# Patient Record
Sex: FEMALE | Race: WHITE | Employment: UNEMPLOYED | URBAN - NONMETROPOLITAN AREA
[De-identification: names, ages, dates, MRNs, and addresses within clinical notes are randomized per-mention and may not be internally consistent; named-entity substitution may affect disease eponyms.]

---

## 2017-06-06 ENCOUNTER — OFFICE VISIT (OUTPATIENT)
Dept: FAMILY MEDICINE CLINIC | Age: 22
End: 2017-06-06
Payer: COMMERCIAL

## 2017-06-06 VITALS
BODY MASS INDEX: 18.66 KG/M2 | OXYGEN SATURATION: 100 % | SYSTOLIC BLOOD PRESSURE: 90 MMHG | HEIGHT: 65 IN | WEIGHT: 112 LBS | DIASTOLIC BLOOD PRESSURE: 62 MMHG | HEART RATE: 54 BPM

## 2017-06-06 DIAGNOSIS — Z29.8 NEED FOR MALARIA PROPHYLAXIS: Primary | ICD-10-CM

## 2017-06-06 PROCEDURE — 99213 OFFICE O/P EST LOW 20 MIN: CPT | Performed by: NURSE PRACTITIONER

## 2017-06-06 RX ORDER — ATOVAQUONE AND PROGUANIL HYDROCHLORIDE 250; 100 MG/1; MG/1
1 TABLET, FILM COATED ORAL DAILY
Qty: 21 TABLET | Refills: 0 | Status: SHIPPED | OUTPATIENT
Start: 2017-08-03 | End: 2017-08-24

## 2017-06-06 ASSESSMENT — ENCOUNTER SYMPTOMS
SINUS PRESSURE: 0
CHEST TIGHTNESS: 0
TROUBLE SWALLOWING: 0
ALLERGIC/IMMUNOLOGIC NEGATIVE: 1
DIARRHEA: 0
EYES NEGATIVE: 1
RESPIRATORY NEGATIVE: 1
ABDOMINAL PAIN: 0
GASTROINTESTINAL NEGATIVE: 1
SHORTNESS OF BREATH: 0
CONSTIPATION: 0
VOMITING: 0
NAUSEA: 0
COUGH: 0

## 2018-05-08 ENCOUNTER — OFFICE VISIT (OUTPATIENT)
Dept: FAMILY MEDICINE CLINIC | Age: 23
End: 2018-05-08
Payer: COMMERCIAL

## 2018-05-08 VITALS
HEART RATE: 76 BPM | DIASTOLIC BLOOD PRESSURE: 56 MMHG | HEIGHT: 65 IN | WEIGHT: 115.2 LBS | BODY MASS INDEX: 19.19 KG/M2 | RESPIRATION RATE: 16 BRPM | SYSTOLIC BLOOD PRESSURE: 118 MMHG

## 2018-05-08 DIAGNOSIS — L98.9 SKIN LESION OF NECK: Primary | ICD-10-CM

## 2018-05-08 PROCEDURE — 99213 OFFICE O/P EST LOW 20 MIN: CPT | Performed by: PHYSICIAN ASSISTANT

## 2018-05-08 ASSESSMENT — PATIENT HEALTH QUESTIONNAIRE - PHQ9
2. FEELING DOWN, DEPRESSED OR HOPELESS: 0
SUM OF ALL RESPONSES TO PHQ9 QUESTIONS 1 & 2: 0
SUM OF ALL RESPONSES TO PHQ QUESTIONS 1-9: 0
1. LITTLE INTEREST OR PLEASURE IN DOING THINGS: 0

## 2018-05-09 ASSESSMENT — ENCOUNTER SYMPTOMS
COLOR CHANGE: 1
RESPIRATORY NEGATIVE: 1

## 2019-09-11 ENCOUNTER — HOSPITAL ENCOUNTER (OUTPATIENT)
Dept: GENERAL RADIOLOGY | Age: 24
Discharge: HOME | End: 2019-09-13
Payer: COMMERCIAL

## 2019-09-11 ENCOUNTER — OFFICE VISIT (OUTPATIENT)
Dept: FAMILY MEDICINE CLINIC | Age: 24
End: 2019-09-11
Payer: COMMERCIAL

## 2019-09-11 VITALS
TEMPERATURE: 98.2 F | WEIGHT: 113.6 LBS | DIASTOLIC BLOOD PRESSURE: 60 MMHG | HEIGHT: 65 IN | HEART RATE: 58 BPM | BODY MASS INDEX: 18.93 KG/M2 | OXYGEN SATURATION: 99 % | RESPIRATION RATE: 16 BRPM | SYSTOLIC BLOOD PRESSURE: 106 MMHG

## 2019-09-11 DIAGNOSIS — R00.2 PALPITATIONS: ICD-10-CM

## 2019-09-11 DIAGNOSIS — R00.2 PALPITATIONS: Primary | ICD-10-CM

## 2019-09-11 PROCEDURE — 93000 ELECTROCARDIOGRAM COMPLETE: CPT | Performed by: FAMILY MEDICINE

## 2019-09-11 PROCEDURE — 99214 OFFICE O/P EST MOD 30 MIN: CPT | Performed by: FAMILY MEDICINE

## 2019-09-11 PROCEDURE — 71046 X-RAY EXAM CHEST 2 VIEWS: CPT

## 2019-09-11 ASSESSMENT — PATIENT HEALTH QUESTIONNAIRE - PHQ9
SUM OF ALL RESPONSES TO PHQ9 QUESTIONS 1 & 2: 0
1. LITTLE INTEREST OR PLEASURE IN DOING THINGS: 0
2. FEELING DOWN, DEPRESSED OR HOPELESS: 0
SUM OF ALL RESPONSES TO PHQ QUESTIONS 1-9: 0
SUM OF ALL RESPONSES TO PHQ QUESTIONS 1-9: 0

## 2019-11-27 LAB
ALBUMIN SERPL-MCNC: 4.8 G/DL
ALP BLD-CCNC: 64 U/L
ALT SERPL-CCNC: 11 U/L
ANION GAP SERPL CALCULATED.3IONS-SCNC: NORMAL MMOL/L
AST SERPL-CCNC: 16 U/L
BASOPHILS ABSOLUTE: 112 /ΜL
BASOPHILS RELATIVE PERCENT: 1.4 %
BILIRUB SERPL-MCNC: 0.4 MG/DL (ref 0.1–1.4)
BUN BLDV-MCNC: 16 MG/DL
CALCIUM SERPL-MCNC: 10.3 MG/DL
CHLORIDE BLD-SCNC: 102 MMOL/L
CO2: NORMAL
CREAT SERPL-MCNC: 0.74 MG/DL
EOSINOPHILS ABSOLUTE: 656 /ΜL
EOSINOPHILS RELATIVE PERCENT: 8.2 %
GFR CALCULATED: 114
GLUCOSE BLD-MCNC: 82 MG/DL
HCT VFR BLD CALC: 42.3 % (ref 36–46)
HEMOGLOBIN: 14.6 G/DL (ref 12–16)
LYMPHOCYTES ABSOLUTE: 2184 /ΜL
LYMPHOCYTES RELATIVE PERCENT: 27.3 %
MCH RBC QN AUTO: 30 PG
MCHC RBC AUTO-ENTMCNC: 34.5 G/DL
MCV RBC AUTO: 86.9 FL
MONOCYTES ABSOLUTE: 504 /ΜL
MONOCYTES RELATIVE PERCENT: 6.3 %
NEUTROPHILS ABSOLUTE: 4544 /ΜL
NEUTROPHILS RELATIVE PERCENT: 56.8 %
PLATELET # BLD: 302 K/ΜL
PMV BLD AUTO: 11.3 FL
POTASSIUM SERPL-SCNC: 4.6 MMOL/L
RBC # BLD: NORMAL 10*6/UL
SODIUM BLD-SCNC: 139 MMOL/L
TOTAL PROTEIN: 7.6
TSH SERPL DL<=0.05 MIU/L-ACNC: 2.73 UIU/ML
WBC # BLD: 8 10^3/ML

## 2019-12-18 LAB
BASOPHILS ABSOLUTE: 0 /ΜL
BASOPHILS RELATIVE PERCENT: 0 %
EOSINOPHILS ABSOLUTE: 500 /ΜL
EOSINOPHILS RELATIVE PERCENT: 10 %
HCT VFR BLD CALC: 40.7 % (ref 36–46)
HEMOGLOBIN: 13.9 G/DL (ref 12–16)
LYMPHOCYTES ABSOLUTE: 1650 /ΜL
LYMPHOCYTES RELATIVE PERCENT: 33 %
MCH RBC QN AUTO: 30 PG
MCHC RBC AUTO-ENTMCNC: 34.2 G/DL
MCV RBC AUTO: 87.7 FL
MONOCYTES ABSOLUTE: 350 /ΜL
MONOCYTES RELATIVE PERCENT: 7 %
NEUTROPHILS ABSOLUTE: 2500 /ΜL
NEUTROPHILS RELATIVE PERCENT: 50 %
PLATELET # BLD: 281 K/ΜL
PMV BLD AUTO: 11.8 FL
RBC # BLD: NORMAL 10*6/UL
WBC # BLD: 5 10^3/ML

## 2020-01-20 LAB
BASOPHILS ABSOLUTE: 45 /ΜL
BASOPHILS RELATIVE PERCENT: 1 %
EOSINOPHILS ABSOLUTE: 315 /ΜL
EOSINOPHILS RELATIVE PERCENT: 7 %
HCT VFR BLD CALC: 39.9 % (ref 36–46)
HEMOGLOBIN: 13.8 G/DL (ref 12–16)
LYMPHOCYTES ABSOLUTE: 1620 /ΜL
LYMPHOCYTES RELATIVE PERCENT: 36 %
MCH RBC QN AUTO: 30.3 PG
MCHC RBC AUTO-ENTMCNC: 34.6 G/DL
MCV RBC AUTO: 87.5 FL
MONOCYTES ABSOLUTE: 270 /ΜL
MONOCYTES RELATIVE PERCENT: 6 %
NEUTROPHILS ABSOLUTE: 2250 /ΜL
NEUTROPHILS RELATIVE PERCENT: 50 %
PLATELET # BLD: 267 K/ΜL
PMV BLD AUTO: 11.7 FL
RBC # BLD: NORMAL 10*6/UL
WBC # BLD: 4.5 10^3/ML

## 2020-03-02 LAB
VITAMIN D 25-HYDROXY: 44
VITAMIN D2, 25 HYDROXY: NORMAL
VITAMIN D3,25 HYDROXY: NORMAL

## 2020-03-18 ENCOUNTER — OFFICE VISIT (OUTPATIENT)
Dept: FAMILY MEDICINE CLINIC | Age: 25
End: 2020-03-18
Payer: COMMERCIAL

## 2020-03-18 VITALS
BODY MASS INDEX: 17.79 KG/M2 | HEIGHT: 65 IN | WEIGHT: 106.78 LBS | SYSTOLIC BLOOD PRESSURE: 100 MMHG | DIASTOLIC BLOOD PRESSURE: 60 MMHG | HEART RATE: 72 BPM

## 2020-03-18 PROCEDURE — 99213 OFFICE O/P EST LOW 20 MIN: CPT | Performed by: PHYSICIAN ASSISTANT

## 2020-03-18 RX ORDER — NORETHINDRONE ACETATE AND ETHINYL ESTRADIOL, ETHINYL ESTRADIOL AND FERROUS FUMARATE 1MG-10(24)
KIT ORAL
COMMUNITY
Start: 2020-02-06 | End: 2021-11-15 | Stop reason: ALTCHOICE

## 2020-03-18 ASSESSMENT — PATIENT HEALTH QUESTIONNAIRE - PHQ9
SUM OF ALL RESPONSES TO PHQ QUESTIONS 1-9: 2
SUM OF ALL RESPONSES TO PHQ QUESTIONS 1-9: 2
1. LITTLE INTEREST OR PLEASURE IN DOING THINGS: 1
2. FEELING DOWN, DEPRESSED OR HOPELESS: 1
SUM OF ALL RESPONSES TO PHQ9 QUESTIONS 1 & 2: 2

## 2020-03-18 NOTE — PROGRESS NOTES
Subjective:      Patient ID: Shalini Hunter is a 22 y.o. female. Patient is seen in the office to discuss recent events. Patient reports that she had a choking episode last fall. The Heimlich maneuver was completed on patient at that time. Patient reports anxiety of the situation initially. She went to counseling and anxiety seemed to improve. She has not been able to eat alone since that time. She moved from her own apartment to live with her boyfriend in fears of being alone. She has a sensation of difficulty swallowing since the episode. She is able to swallow liquids without difficulty but does have trouble swallowing solids. She reports that her anxiety has become more generalized. She has considered scheduling an appointment with the counselor again. She is currently living at home and doing distance learning in the light of COVID-19. Patient also admits to amenorrhea. She has been seen at [facility] and was started on OCP. She reports low progesterone levels. She is 80+ days without a menstrual cycle currently. Review of Systems   Constitutional: Negative. HENT: Positive for trouble swallowing. Respiratory: Negative. Gastrointestinal: Negative. Genitourinary: Positive for menstrual problem. Psychiatric/Behavioral: The patient is nervous/anxious. Objective:   Physical Exam  HENT:      Head: Normocephalic. Left Ear: Tympanic membrane normal.   Cardiovascular:      Rate and Rhythm: Normal rate and regular rhythm. Pulmonary:      Effort: Pulmonary effort is normal.   Abdominal:      General: Abdomen is flat. Palpations: Abdomen is soft. Skin:     General: Skin is warm and dry. Neurological:      General: No focal deficit present. Mental Status: She is alert. Psychiatric:         Mood and Affect: Mood is anxious. Assessment:      1. Dysphagia, unspecified type    2. Situational anxiety    3.  Amenorrhea          Plan:      Obtain laboratory studies completed by [employer]. Schedule swallow study. Patient encouraged to schedule appointment with counselor. Coping strategies and self-relaxation techniques. Follow-up in one months/sooner PRN.         ANASTASIA Matson

## 2020-03-21 ASSESSMENT — ENCOUNTER SYMPTOMS
RESPIRATORY NEGATIVE: 1
TROUBLE SWALLOWING: 1
GASTROINTESTINAL NEGATIVE: 1

## 2020-03-31 ENCOUNTER — HOSPITAL ENCOUNTER (OUTPATIENT)
Dept: GENERAL RADIOLOGY | Age: 25
Discharge: HOME | End: 2020-04-02
Payer: COMMERCIAL

## 2020-03-31 PROCEDURE — 74220 X-RAY XM ESOPHAGUS 1CNTRST: CPT

## 2020-03-31 PROCEDURE — 2500000003 HC RX 250 WO HCPCS: Performed by: PHYSICIAN ASSISTANT

## 2020-03-31 RX ADMIN — BARIUM SULFATE 750 ML: 960 POWDER, FOR SUSPENSION ORAL at 09:46

## 2020-03-31 RX ADMIN — BARIUM SULFATE 140 ML: 980 POWDER, FOR SUSPENSION ORAL at 09:46

## 2020-04-02 ENCOUNTER — PATIENT MESSAGE (OUTPATIENT)
Dept: FAMILY MEDICINE CLINIC | Age: 25
End: 2020-04-02

## 2020-04-06 ENCOUNTER — PATIENT MESSAGE (OUTPATIENT)
Dept: FAMILY MEDICINE CLINIC | Age: 25
End: 2020-04-06

## 2020-05-21 ENCOUNTER — PATIENT MESSAGE (OUTPATIENT)
Dept: FAMILY MEDICINE CLINIC | Age: 25
End: 2020-05-21

## 2020-05-22 ENCOUNTER — PATIENT MESSAGE (OUTPATIENT)
Dept: FAMILY MEDICINE CLINIC | Age: 25
End: 2020-05-22

## 2020-05-22 NOTE — TELEPHONE ENCOUNTER
From: Nilam Menchaca  To: ANASTASIA Engle  Sent: 5/21/2020 8:28 PM EDT  Subject: Visit Follow-Up Question    Hello,     I just wanted to reach out and ask about your recommendation for an OB/GYN to discuss my irregular periods. My last period was 112 days apart, which was the longest it's ever been. I just had an appointment with Dr. Hallie Resendez at [facility] and was not satisfied with that appointment. I did not feel that he adequately addressed my concerns. I would like to see another OB/GYN, so was wondering if you had a different one that you would recommend. I appreciate it! Thank you in advance.     Amina Marquez

## 2021-05-04 ENCOUNTER — OFFICE VISIT (OUTPATIENT)
Dept: FAMILY MEDICINE CLINIC | Age: 26
End: 2021-05-04
Payer: COMMERCIAL

## 2021-05-04 VITALS
TEMPERATURE: 98.7 F | BODY MASS INDEX: 18.83 KG/M2 | WEIGHT: 113 LBS | DIASTOLIC BLOOD PRESSURE: 70 MMHG | SYSTOLIC BLOOD PRESSURE: 124 MMHG | HEART RATE: 78 BPM | OXYGEN SATURATION: 98 % | HEIGHT: 65 IN

## 2021-05-04 DIAGNOSIS — F41.1 GAD (GENERALIZED ANXIETY DISORDER): ICD-10-CM

## 2021-05-04 DIAGNOSIS — R13.12 OROPHARYNGEAL DYSPHAGIA: Primary | ICD-10-CM

## 2021-05-04 PROCEDURE — 99214 OFFICE O/P EST MOD 30 MIN: CPT | Performed by: FAMILY MEDICINE

## 2021-05-04 RX ORDER — BUSPIRONE HYDROCHLORIDE 10 MG/1
10 TABLET ORAL 3 TIMES DAILY PRN
Qty: 90 TABLET | Refills: 1 | Status: SHIPPED | OUTPATIENT
Start: 2021-05-04 | End: 2021-11-15 | Stop reason: ALTCHOICE

## 2021-05-04 RX ORDER — CITALOPRAM 10 MG/1
10 TABLET ORAL DAILY
Qty: 30 TABLET | Refills: 1 | Status: SHIPPED | OUTPATIENT
Start: 2021-05-04 | End: 2021-06-29 | Stop reason: SDUPTHER

## 2021-05-04 ASSESSMENT — ENCOUNTER SYMPTOMS
SHORTNESS OF BREATH: 0
WHEEZING: 0
CHEST TIGHTNESS: 0
COUGH: 0

## 2021-05-04 NOTE — PROGRESS NOTES
[address]  [address] [facility] [address]  Dept: [phone]  Dept Fax: [phone]  Loc: [phone]    Padma Mendiola is a 26 y.o. female who presents today for her medical conditions/complaints as noted below. Padma Mendiola is c/o of   Chief Complaint   Patient presents with   [facility] ED Follow-up     [facility] ER follow up, 4/29/21, Foreign body in throat    Other     swallow study       HPI:     HPI Here today for an ER follow up. She was in the ER in [place] on 4/29    She took an Advil while she was taking an exam and it got stuck in her throat. She ended up in the ER and it dislodged while she was there without intervention. She does not have any issues with GERD. She had a similar episode a year ago with food and had a normal swallow study. Since this episode she has been very anxious and she is afraid to eat or swallow pills. 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 She was anxious after her first choking episode and she went to counseling for a few sessions which seemed to help but she has not been back in awhile. Past Medical History:   Diagnosis Date    Pneumonia 1999    history of as a child, hospitalized    Scoliosis     history of           Social History     Tobacco Use    Smoking status: Never Smoker    Smokeless tobacco: Never Used   Substance Use Topics    Alcohol use: Yes     Current Outpatient Medications   Medication Sig Dispense Refill    citalopram (CELEXA) 10 MG tablet Take 1 tablet by mouth daily 30 tablet 1    busPIRone (BUSPAR) 10 MG tablet Take 1 tablet by mouth 3 times daily as needed (anxiety) 90 tablet 1    norethindrone-ethinyl estradiol-Fe (LO LOESTRIN FE) 1 MG-10 MCG / 10 MCG tablet       Multiple Vitamins-Minerals (MULTIVITAMIN ADULT PO) Take by mouth       No current facility-administered medications for this visit.            Allergies   Allergen Reactions  Ceftin [Cefuroxime] Nausea And Vomiting       Subjective:     Review of Systems   Constitutional: Negative for activity change, appetite change, chills, fatigue and fever. Eyes: Negative for visual disturbance. Respiratory: Negative for cough, chest tightness, shortness of breath and wheezing. Cardiovascular: Negative for chest pain, palpitations and leg swelling. Genitourinary: Negative for difficulty urinating. Neurological: Negative for dizziness, syncope, weakness, light-headedness and headaches. Objective:      Physical Exam  Vitals signs and nursing note reviewed. Constitutional:       General: She is not in acute distress. Appearance: She is well-developed. Eyes:      Conjunctiva/sclera: Conjunctivae normal.   Neck:      Musculoskeletal: Normal range of motion and neck supple. Thyroid: No thyromegaly. Cardiovascular:      Rate and Rhythm: Normal rate and regular rhythm. Heart sounds: Normal heart sounds. No murmur. Pulmonary:      Effort: Pulmonary effort is normal. No respiratory distress. Breath sounds: Normal breath sounds. No wheezing. Abdominal:      General: Abdomen is flat. Bowel sounds are normal. There is no distension. Tenderness: There is no abdominal tenderness. There is no guarding or rebound. Hernia: No hernia is present. Lymphadenopathy:      Cervical: No cervical adenopathy. Skin:     General: Skin is warm and dry. Findings: No erythema or rash. Neurological:      Mental Status: She is alert and oriented to person, place, and time. Psychiatric:         Mood and Affect: Mood normal.         Behavior: Behavior normal.         Thought Content: Thought content normal.         Judgment: Judgment normal.       /70   Pulse 78   Temp 98.7 °F (37.1 °C)   Ht 5' 5\" (1.651 m)   Wt 113 lb (51.3 kg)   SpO2 98%   BMI 18.80 kg/m²     Assessment:       Diagnosis Orders   1.  Oropharyngeal dysphagia  [facility] General Surgery, [place]   2. REGGIE (generalized anxiety disorder)               Plan:        Dysphagia: worsening; since she has now had two choking episodes I referred her to general surgery for an EGD to rule a stricture. REGGIE: woresning; Katerine Mejia was started on celexa. I explained that the main side effect is GI distress. I will start with a low dose and titrate up as needed. she was instructed that it can take 4-6weeks before the medication is working to its full potential. She will see her pcp to see how she is doing in a month and increase the dose as needed. I also explained that I don't like to use xanax because it has addictive potential. I told her that I prefer to use buspar for panic attacks and she was willing to try it so a prescription was given. she is planning on going back to counseling. Return in about 1 month (around 6/4/2021) for Anxiety follow up. Orders Placed This Encounter   Procedures   [facility] General Surgery, [place]     Referral Priority:   Routine     Referral Type:   Eval and Treat     Referral Reason:   Specialty Services Required     Requested Specialty:   General Surgery     Number of Visits Requested:   1     Orders Placed This Encounter   Medications    citalopram (CELEXA) 10 MG tablet     Sig: Take 1 tablet by mouth daily     Dispense:  30 tablet     Refill:  1    busPIRone (BUSPAR) 10 MG tablet     Sig: Take 1 tablet by mouth 3 times daily as needed (anxiety)     Dispense:  90 tablet     Refill:  1       Patientgiven educational materials - see patient instructions. Discussed use, benefit,and side effects of prescribed medications. All patient questions answered. Ptvoiced understanding. Reviewed health maintenance. Instructed to continue currentmedications, diet and exercise. Patient agreed with treatment plan. Follow up asdirected.      Electronically signed by Zahida Verdin MD on 5/4/2021 at 1:21 PM

## 2021-05-10 ENCOUNTER — TELEPHONE (OUTPATIENT)
Dept: FAMILY MEDICINE CLINIC | Age: 26
End: 2021-05-10

## 2021-05-13 ENCOUNTER — INITIAL CONSULT (OUTPATIENT)
Dept: SURGERY | Age: 26
End: 2021-05-13
Payer: COMMERCIAL

## 2021-05-13 VITALS
BODY MASS INDEX: 18.66 KG/M2 | DIASTOLIC BLOOD PRESSURE: 70 MMHG | WEIGHT: 112 LBS | HEIGHT: 65 IN | SYSTOLIC BLOOD PRESSURE: 110 MMHG | HEART RATE: 84 BPM | RESPIRATION RATE: 16 BRPM

## 2021-05-13 DIAGNOSIS — R13.14 PHARYNGOESOPHAGEAL DYSPHAGIA: Primary | ICD-10-CM

## 2021-05-13 PROCEDURE — 99203 OFFICE O/P NEW LOW 30 MIN: CPT | Performed by: SURGERY

## 2021-05-13 NOTE — PATIENT INSTRUCTIONS
Patient Education      Patient Education        Upper GI Endoscopy: Before Your Procedure  What is an upper GI endoscopy? An upper gastrointestinal (or GI) endoscopy is a test that allows your doctor to look at the inside of your esophagus, stomach, and the first part of your small intestine, called the duodenum. The esophagus is the tube that carries food to your stomach. The doctor uses a thin, lighted tube that bends. It is called an endoscope, or scope. The doctor puts the tip of the scope in your mouth and gently moves it down your throat. The scope is a flexible video camera. The doctor looks at a monitor (like a TV set or a computer screen) as he or she moves the scope. A doctor may do this procedure to look for ulcers, tumors, infection, or bleeding. It also can be used to look for signs of acid backing up into your esophagus. This is called gastroesophageal reflux disease, or GERD. The doctor can use the scope to take a sample of tissue for study (a biopsy). The doctor also can use the scope to take out growths or stop bleeding. Follow-up care is a key part of your treatment and safety. Be sure to make and go to all appointments, and call your doctor if you are having problems. It's also a good idea to know your test results and keep a list of the medicines you take. How do you prepare for the procedure? Procedures can be stressful. This information will help you understand what you can expect. And it will help you safely prepare for your procedure. Preparing for the procedure    · Do not eat or drink anything for 6 to 8 hours before the test. An empty stomach helps your doctor see your stomach clearly during the test. It also reduces your chances of vomiting. If you vomit, there is a small risk that the vomit could enter your lungs.  (This is called aspiration.) If the test is done in an emergency, a tube may be inserted through your nose or mouth to empty your stomach.     · Do not take sucralfate (Carafate) or antacids on the day of the test. These medicines can make it hard for your doctor to see your upper GI tract.     · If your doctor tells you to, stop taking iron supplements 7 to 14 days before the test.     · Be sure you have someone to take you home. Anesthesia and pain medicine will make it unsafe for you to drive or get home on your own.     · Understand exactly what procedure is planned, along with the risks, benefits, and other options. · Tell your doctor ALL the medicines, vitamins, supplements, and herbal remedies you take. Some may increase the risk of problems during your procedure. Your doctor will tell you if you should stop taking any of them before the procedure and how soon to do it.     · If you take aspirin or some other blood thinner, ask your doctor if you should stop taking it before your procedure. Make sure that you understand exactly what your doctor wants you to do. These medicines increase the risk of bleeding.     · Make sure your doctor and the hospital have a copy of your advance directive. If you don't have one, you may want to prepare one. It lets others know your health care wishes. It's a good thing to have before any type of surgery or procedure. What happens on the day of the procedure? · Follow the instructions exactly about when to stop eating and drinking. If you don't, your procedure may be canceled. If your doctor told you to take your medicines on the day of the procedure, take them with only a sip of water.     · Take a bath or shower before you come in for your procedure. Do not apply lotions, perfumes, deodorants, or nail polish.     · Take off all jewelry and piercings. And take out contact lenses, if you wear them. At the hospital or surgery center   · Bring a picture ID.     · The test may take 15 to 30 minutes.     · The doctor may spray medicine on the back of your throat to numb it. You also will get medicine to prevent pain and to relax you.   · You will lie on your left side. The doctor will put the scope in your mouth and toward the back of your throat. The doctor will tell you when to swallow. This helps the scope move down your throat. You will be able to breathe normally. The doctor will move the scope down your esophagus into your stomach. The doctor also may look at the duodenum.     · If your doctor wants to take a sample of tissue for a biopsy, he or she may use small surgical tools, which are put into the scope, to cut off some tissue. You will not feel a biopsy, if one is taken. The doctor also can use the tools to stop bleeding or to do other treatments, if needed.     · You will stay at the hospital or surgery center for 1 to 2 hours until the medicine you were given wears off. What happens after an upper GI endoscopy? · After the test, you may belch and feel bloated for a while.     · You may have a tickling, dry throat or mouth. You may feel a bit hoarse, and you may have a mild sore throat. These symptoms may last several days. Throat lozenges and warm saltwater gargles can help relieve the throat symptoms.     · Don't drive or operate machinery for 12 hours after the test.     · Your doctor will tell you when you can go back to your usual diet and activities.     · Don't drink alcohol for 12 to 24 hours after the test.   When should you call your doctor? · You have questions or concerns.     · You don't understand how to prepare for your procedure.     · You become ill before the procedure (such as fever, flu, or a cold).     · You need to reschedule or have changed your mind about having the procedure. Where can you learn more? Go to [email or web address] org and sign in to your [employer] account. Enter P790 in the Max-Viz box to learn more about \"Upper GI Endoscopy: Before Your Procedure. \"     If you do not have an account, please click on the \"Sign Up Now\" link.   Current as of: April 15, 2020               Content Version: 12.8  © 2006-2021 Healthwise, [employer]. Care instructions adapted under license by [facility] ([facility]). If you have questions about a medical condition or this instruction, always ask your healthcare professional. [address] any warranty or liability for your use of this information. Upper GI Endoscopy: What to Expect at [address] had an upper GI endoscopy. Your doctor used a thin, lighted tube that bends to look at the inside of your esophagus, your stomach, and the first part of the small intestine, called the duodenum. After you have an endoscopy, you will stay at the hospital or clinic for 1 to 2 hours. This will allow the medicine to wear off. You will be able to go home after your doctor or nurse checks to make sure that you're not having any problems. You may have to stay overnight if you had treatment during the test. You may have a sore throat for a day or two after the test.  This care sheet gives you a general idea about what to expect after the test.  How can you care for yourself at home? Activity   · Rest as much as you need to after you go home. · You should be able to go back to your usual activities the day after the test.  Diet   · Follow your doctor's directions for eating after the test.  · Drink plenty of fluids (unless your doctor has told you not to). Medications   · If you have a sore throat the day after the test, use an over-the-counter spray to numb your throat. Follow-up care is a key part of your treatment and safety. Be sure to make and go to all appointments, and call your doctor if you are having problems. It's also a good idea to know your test results and keep a list of the medicines you take. When should you call for help? Call 911 anytime you think you may need emergency care.  For example, call if:    · You passed out (lost consciousness).     · You have trouble breathing.     · You pass maroon or bloody stools. Call your doctor now or seek immediate medical care if:    · You have pain that does not get better after your take pain medicine.     · You have new or worse belly pain.     · You have blood in your stools.     · You are sick to your stomach and cannot keep fluids down.     · You have a fever.     · You cannot pass stools or gas. Watch closely for changes in your health, and be sure to contact your doctor if:    · Your throat still hurts after a day or two.     · You do not get better as expected. Where can you learn more? Go to https://Udexpepiceweb.[place] [email or web address] org and sign in to your [facility] account. Enter I027 in the Treatful box to learn more about \"Upper GI Endoscopy: What to Expect at Home. \"     If you do not have an account, please click on the \"Sign Up Now\" link. Current as of: April 15, 2020               Content Version: 12.8  © 2006-2021 Healthwise, Incorporated. Care instructions adapted under license by [facility] ([facility]). If you have questions about a medical condition or this instruction, always ask your healthcare professional. [place] [address] any warranty or liability for your use of this information.

## 2021-05-13 NOTE — PROGRESS NOTES
facility-administered medications for this visit. Allergies   Allergen Reactions    Ceftin [Cefuroxime] Nausea And Vomiting       History reviewed. No pertinent family history. Social History     Socioeconomic History    Marital status: Single     Spouse name: Not on file    Number of children: Not on file    Years of education: Not on file    Highest education level: Not on file   Occupational History    Not on file   Social Needs    Financial resource strain: Not on file    Food insecurity     Worry: Not on file     Inability: Not on file    Transportation needs     Medical: Not on file     Non-medical: Not on file   Tobacco Use    Smoking status: Never Smoker    Smokeless tobacco: Never Used   Substance and Sexual Activity    Alcohol use:  Yes    Drug use: No    Sexual activity: Not on file   Lifestyle    Physical activity     Days per week: Not on file     Minutes per session: Not on file    Stress: Not on file   Relationships    Social connections     Talks on phone: Not on file     Gets together: Not on file     Attends Synagogue service: Not on file     Active member of club or organization: Not on file     Attends meetings of clubs or organizations: Not on file     Relationship status: Not on file    Intimate partner violence     Fear of current or ex partner: Not on file     Emotionally abused: Not on file     Physically abused: Not on file     Forced sexual activity: Not on file   Other Topics Concern    Not on file   Social History Narrative    Not on file       ROS:   Review of Systems - General ROS: negative  Psychological ROS: negative  Ophthalmic ROS: negative  ENT ROS: negative  Respiratory ROS: no cough, shortness of breath, or wheezing  Cardiovascular ROS: no chest pain or dyspnea on exertion  Gastrointestinal ROS: per HPI  Genito-Urinary ROS: no dysuria, trouble voiding, or hematuria  Musculoskeletal ROS: negative  Dermatological ROS: negative      Objective   Vitals: 05/13/21 1337   BP: 110/70   Pulse: 84   Resp: 16     General:in no apparent distress and well developed and well nourished  Eyes: No gross abnormalities. Ears, Nose, Throat: hearing grossly normal bilaterally  Neck: neck supple and non tender without mass  Lungs: clear to auscultation without wheezes or rales   Heart: S1S2, no mumurs, RRR  Abdomen: soft, nontender, no HSM, no guarding, no rebound, no masses  Extremity: negative  Neuro: CN II-XII grossly intact      Assessment     3  73-year-old female with pharyngoesophageal dysphagia      Plan     1. As the patient had a normal esophagram this certainly rules out any obstructive process. I am going to proceed with a EGD for further tissue evaluation and for likely biopsies to ensure no other issues that may not have been identified on the esophagram.  If no findings at the EGD we could proceed with formal swallow evaluation at that time but will make further recommendations based on findings and EGD.     Electronically signed by Varinder Stern DO on 5/13/2021 at 2:03 PM      (Please note that portions of this note were completed with a voice recognition program.  Efforts were made to edit the dictations but occasionally words are mis-transcribed.)

## 2021-06-02 NOTE — H&P
Josiah Jordan is a 26 y.o. female who presents today for dysphagia. The patient reports that approximately a year or so ago she had an episode where she had sensation of a pill getting stuck. This passed fairly quickly but she did see physician for this at that time and had an esophagram which showed no abnormalities. The patient had no further issues and really had not thought much about it. However couple weeks ago she states that she again had pill get stuck and she states when she took an ibuprofen it felt like it stuck in the back of her throat or upper part of her chest.  This sensation lasted for approximately 30 minutes and she did see an ER physician and an out-of-town facility who had her drink some water which she initially had some difficulty with but after feeling the pill passed she had no further problems and was discharged home. Since then she has seen her PCP who referred her to surgery for further evaluation since this is now recurrent episode. Patient denies any nausea or emesis. No significant history of heartburn. No changes in bowel movements. No unintended weight loss. She denies any known family history of esophageal disorders or gastrointestinal cancers. She states typically she can eat without any issues and this only seems to occur very infrequently with pills.     Past Medical History        Past Medical History:   Diagnosis Date    Pneumonia 1999     history of as a child, hospitalized    Scoliosis       history of             Past Surgical History   History reviewed.  No pertinent surgical history.        Current Facility-Administered Medications          Current Outpatient Medications   Medication Sig Dispense Refill    citalopram (CELEXA) 10 MG tablet Take 1 tablet by mouth daily 30 tablet 1    busPIRone (BUSPAR) 10 MG tablet Take 1 tablet by mouth 3 times daily as needed (anxiety) 90 tablet 1    norethindrone-ethinyl estradiol-Fe (LO LOESTRIN FE) 1 MG-10 MCG / 10 MCG tablet          Multiple Vitamins-Minerals (MULTIVITAMIN ADULT PO) Take by mouth          No current facility-administered medications for this visit.                  Allergies   Allergen Reactions    Ceftin [Cefuroxime] Nausea And Vomiting         Family History   History reviewed. No pertinent family history.        Social History               Socioeconomic History    Marital status: Single       Spouse name: Not on file    Number of children: Not on file    Years of education: Not on file    Highest education level: Not on file   Occupational History    Not on file   Social Needs    Financial resource strain: Not on file    Food insecurity       Worry: Not on file       Inability: Not on file    Transportation needs       Medical: Not on file       Non-medical: Not on file   Tobacco Use    Smoking status: Never Smoker    Smokeless tobacco: Never Used   Substance and Sexual Activity    Alcohol use:  Yes    Drug use: No    Sexual activity: Not on file   Lifestyle    Physical activity       Days per week: Not on file       Minutes per session: Not on file    Stress: Not on file   Relationships    Social connections       Talks on phone: Not on file       Gets together: Not on file       Attends Zoroastrianism service: Not on file       Active member of club or organization: Not on file       Attends meetings of clubs or organizations: Not on file       Relationship status: Not on file    Intimate partner violence       Fear of current or ex partner: Not on file       Emotionally abused: Not on file       Physically abused: Not on file       Forced sexual activity: Not on file   Other Topics Concern    Not on file   Social History Narrative    Not on file            ROS:   Review of Systems - General ROS: negative  Psychological ROS: negative  Ophthalmic ROS: negative  ENT ROS: negative  Respiratory ROS: no cough, shortness of breath, or wheezing  Cardiovascular ROS: no chest pain or dyspnea on exertion  Gastrointestinal ROS: per HPI  Genito-Urinary ROS: no dysuria, trouble voiding, or hematuria  Musculoskeletal ROS: negative  Dermatological ROS: negative        Objective       Vitals:     05/13/21 1337   BP: 110/70   Pulse: 84   Resp: 16      General:in no apparent distress and well developed and well nourished  Eyes: No gross abnormalities. Ears, Nose, Throat: hearing grossly normal bilaterally  Neck: neck supple and non tender without mass  Lungs: clear to auscultation without wheezes or rales   Heart: S1S2, no mumurs, RRR  Abdomen: soft, nontender, no HSM, no guarding, no rebound, no masses  Extremity: negative  Neuro: CN II-XII grossly intact        Assessment      3  23-year-old female with pharyngoesophageal dysphagia        Plan      1. As the patient had a normal esophagram this certainly rules out any obstructive process.   I am going to proceed with a EGD for further tissue evaluation and for likely biopsies to ensure no other issues that may not have been identified on the esophagram.  If no findings at the EGD we could proceed with formal swallow evaluation at that time but will make further recommendations based on findings and EGD.     Electronically signed by Erin Robison DO on 5/13/2021 at 2:03 PM        (Please note that portions of this note were completed with a voice recognition program.  Efforts were made to edit the dictations but occasionally words are mis-transcribed.)    The patient was interviewed and examined and there have been no changes since the above History and Physical.    Electronically signed by Erin Robison DO on 6/2/2021 at 6:30 PM

## 2021-06-03 ENCOUNTER — ANESTHESIA EVENT (OUTPATIENT)
Dept: OPERATING ROOM | Age: 26
End: 2021-06-03
Payer: COMMERCIAL

## 2021-06-03 ENCOUNTER — ANESTHESIA (OUTPATIENT)
Dept: OPERATING ROOM | Age: 26
End: 2021-06-03
Payer: COMMERCIAL

## 2021-06-03 ENCOUNTER — HOSPITAL ENCOUNTER (OUTPATIENT)
Age: 26
Setting detail: OUTPATIENT SURGERY
Discharge: HOME | End: 2021-06-03
Attending: SURGERY | Admitting: SURGERY
Payer: COMMERCIAL

## 2021-06-03 VITALS
OXYGEN SATURATION: 99 % | BODY MASS INDEX: 18.18 KG/M2 | RESPIRATION RATE: 14 BRPM | TEMPERATURE: 97.5 F | WEIGHT: 109.13 LBS | HEART RATE: 65 BPM | DIASTOLIC BLOOD PRESSURE: 75 MMHG | SYSTOLIC BLOOD PRESSURE: 110 MMHG | HEIGHT: 65 IN

## 2021-06-03 VITALS — OXYGEN SATURATION: 97 % | SYSTOLIC BLOOD PRESSURE: 96 MMHG | DIASTOLIC BLOOD PRESSURE: 60 MMHG

## 2021-06-03 LAB — HCG, PREGNANCY URINE (POC): NEGATIVE

## 2021-06-03 PROCEDURE — 2709999900 HC NON-CHARGEABLE SUPPLY: Performed by: SURGERY

## 2021-06-03 PROCEDURE — 6360000002 HC RX W HCPCS: Performed by: NURSE ANESTHETIST, CERTIFIED REGISTERED

## 2021-06-03 PROCEDURE — 88305 TISSUE EXAM BY PATHOLOGIST: CPT

## 2021-06-03 PROCEDURE — 43239 EGD BIOPSY SINGLE/MULTIPLE: CPT | Performed by: SURGERY

## 2021-06-03 PROCEDURE — 3609012400 HC EGD TRANSORAL BIOPSY SINGLE/MULTIPLE: Performed by: SURGERY

## 2021-06-03 PROCEDURE — 2500000003 HC RX 250 WO HCPCS: Performed by: NURSE ANESTHETIST, CERTIFIED REGISTERED

## 2021-06-03 PROCEDURE — 81025 URINE PREGNANCY TEST: CPT

## 2021-06-03 PROCEDURE — 3700000000 HC ANESTHESIA ATTENDED CARE: Performed by: SURGERY

## 2021-06-03 PROCEDURE — 2580000003 HC RX 258: Performed by: SURGERY

## 2021-06-03 PROCEDURE — 7100000011 HC PHASE II RECOVERY - ADDTL 15 MIN: Performed by: SURGERY

## 2021-06-03 PROCEDURE — 7100000010 HC PHASE II RECOVERY - FIRST 15 MIN: Performed by: SURGERY

## 2021-06-03 RX ORDER — SODIUM CHLORIDE 9 MG/ML
25 INJECTION, SOLUTION INTRAVENOUS PRN
Status: DISCONTINUED | OUTPATIENT
Start: 2021-06-03 | End: 2021-06-03 | Stop reason: HOSPADM

## 2021-06-03 RX ORDER — SODIUM CHLORIDE, SODIUM LACTATE, POTASSIUM CHLORIDE, CALCIUM CHLORIDE 600; 310; 30; 20 MG/100ML; MG/100ML; MG/100ML; MG/100ML
INJECTION, SOLUTION INTRAVENOUS CONTINUOUS
Status: DISCONTINUED | OUTPATIENT
Start: 2021-06-03 | End: 2021-06-03 | Stop reason: HOSPADM

## 2021-06-03 RX ORDER — SODIUM CHLORIDE 0.9 % (FLUSH) 0.9 %
10 SYRINGE (ML) INJECTION PRN
Status: DISCONTINUED | OUTPATIENT
Start: 2021-06-03 | End: 2021-06-03 | Stop reason: HOSPADM

## 2021-06-03 RX ORDER — SODIUM CHLORIDE 0.9 % (FLUSH) 0.9 %
10 SYRINGE (ML) INJECTION EVERY 12 HOURS SCHEDULED
Status: DISCONTINUED | OUTPATIENT
Start: 2021-06-03 | End: 2021-06-03 | Stop reason: HOSPADM

## 2021-06-03 RX ORDER — LIDOCAINE HYDROCHLORIDE 40 MG/ML
INJECTION, SOLUTION RETROBULBAR; TOPICAL PRN
Status: DISCONTINUED | OUTPATIENT
Start: 2021-06-03 | End: 2021-06-03 | Stop reason: SDUPTHER

## 2021-06-03 RX ORDER — PROPOFOL 10 MG/ML
INJECTION, EMULSION INTRAVENOUS PRN
Status: DISCONTINUED | OUTPATIENT
Start: 2021-06-03 | End: 2021-06-03 | Stop reason: SDUPTHER

## 2021-06-03 RX ADMIN — SODIUM CHLORIDE, POTASSIUM CHLORIDE, SODIUM LACTATE AND CALCIUM CHLORIDE: 600; 310; 30; 20 INJECTION, SOLUTION INTRAVENOUS at 09:15

## 2021-06-03 RX ADMIN — PROPOFOL 100 MG: 10 INJECTION, EMULSION INTRAVENOUS at 09:48

## 2021-06-03 RX ADMIN — LIDOCAINE HYDROCHLORIDE 100 MG: 40 INJECTION, SOLUTION RETROBULBAR; TOPICAL at 09:46

## 2021-06-03 RX ADMIN — PROPOFOL 100 MG: 10 INJECTION, EMULSION INTRAVENOUS at 09:53

## 2021-06-03 RX ADMIN — PROPOFOL 100 MG: 10 INJECTION, EMULSION INTRAVENOUS at 09:46

## 2021-06-03 ASSESSMENT — PAIN - FUNCTIONAL ASSESSMENT: PAIN_FUNCTIONAL_ASSESSMENT: 0-10

## 2021-06-03 ASSESSMENT — PAIN SCALES - GENERAL
PAINLEVEL_OUTOF10: 0
PAINLEVEL_OUTOF10: 0

## 2021-06-03 NOTE — ANESTHESIA POSTPROCEDURE EVALUATION
Department of Anesthesiology  Postprocedure Note    Patient: Rayo Mckinnon  MRN: [id number]  YOB: 1995  Date of evaluation: 6/3/2021  Time:  9:59 AM     Procedure Summary     Date: 06/03/21 Room / Location: [address]    Anesthesia Start: 4965 Anesthesia Stop: 9787    Procedure: EGD BIOPSY (N/A ) Diagnosis: (dysphagia)    Surgeons: Enrrique Jordan DO Responsible Provider: YUSEF Wade CRNA    Anesthesia Type: MAC ASA Status: 2          Anesthesia Type: MAC    Ami Phase I: Ami Score: 10    Ami Phase II: Ami Score: 9    Last vitals: Reviewed and per EMR flowsheets.        Anesthesia Post Evaluation    Patient location during evaluation: PACU  Patient participation: complete - patient participated  Level of consciousness: awake, awake and alert and responsive to light touch  Pain score: 0  Airway patency: patent  Nausea & Vomiting: no nausea and no vomiting  Complications: no  Cardiovascular status: blood pressure returned to baseline and hemodynamically stable  Respiratory status: acceptable  Hydration status: euvolemic

## 2021-06-03 NOTE — OP NOTE
Radha [address]                 [address] [place] [address]                                OPERATIVE REPORT    PATIENT NAME: Brandon Vaughn                    :        1995  MED REC NO:   [id number]                             ROOM:  ACCOUNT NO:   [de-identified]                           ADMIT DATE: 2021  PROVIDER:     Orma Curling    DATE OF PROCEDURE:  2021    SURGEON:  Dr. Orma Curling. ASSISTANT:  None. PREOPERATIVE DIAGNOSIS:  Dysphagia. POSTOPERATIVE DIAGNOSES:  1. Dysphagia. 2.  Slight narrowing at the upper esophageal sphincter. PROCEDURE:  EGD with biopsies. ANESTHESIA:  MAC.    ESTIMATED BLOOD LOSS:  Minimal.    FLUIDS:  Per anesthesia record. COMPLICATIONS:  None. SPECIMENS:  1. Biopsy in gastric antrum. 2.  Biopsy in distal esophagus. INDICATIONS FOR PROCEDURE:  The patient is a 79-year-old female who is  referred to my office with complaints of dysphagia. She has had prior  workup with esophagram that showed no findings, but had a recurrent  episode and so, she was referred to General Surgery. After evaluation,  decision was made to proceed with EGD. Prior to the time of the  procedure, risks, benefits, and alternatives were explained and consent  was obtained. DESCRIPTION OF PROCEDURE:  The patient was brought to endoscopy suite,  kept on preoperative gurney and placed in the left lateral decubitus  position. Monitoring devices were placed. MAC anesthesia was induced. After induction of anesthesia, time-out was performed and correct  patient and procedure were verified. Bite block was placed. The  Olympus video endoscope was lubricated, inserted into the patient's  oropharynx and directed into the esophagus under visualization.   During  entrance into the esophagus, it was noted that the patient's upper  esophageal sphincter was fairly robust and I did have a little bit of  difficulty intubating the esophagus. However, once we were in, the  remainder of the esophagus as we advanced appeared unremarkable. Scope  was advanced into the stomach which was insufflated with air and then  further advanced through pylorus into duodenal bulb and insufflated  through duodenal sweep. Once at the second portion of the duodenum, the  scope was slowly withdrawn carefully inspecting mucosa. Duodenal mucosa  was healthy appearing. The scope was then withdrawn back into the lumen  of the stomach. Inspection of the distal stomach showed no significant  abnormalities. Small biopsy was obtained in the antrum for H. pylori  testing. Retroflex view was then obtained which did not demonstrate any  evidence of hiatal hernia and proximal stomach was unremarkable. Remainder of the gastric mucosa appeared healthy. Stomach was suctioned  of air and scope was withdrawn into the distal esophagus. GE junction  was at 39 cm and Z-line was unremarkable. Inspection of the distal  esophagus showed no abnormalities, but I did take small biopsy here just  because of the patient's complaints and presenting symptom. Scope was  then withdrawn through the remainder of the esophagus with no other  findings. Once at the upper esophageal sphincter, I did withdraw the  scope and then reintubated a couple times without any difficulty. I am  unsure if the difficulty initially was just because of possibly some  mild muscle spasm, but on inspection as we were coming out, this area  was widely patent. Scope was then removed and procedure was concluded. At conclusion of the procedure, the patient was in stable condition and  was taken to the PACU for recovery. PLAN:  I am going to follow up results of biopsies taken and we will  make additional recommendations based on those results.         Radha Paredes    D: 06/03/2021 10:04:06       T: 06/03/2021 10:12:42     [email or web address]  Job#: [id number]     Doc#: [id number]    CC: Primary Care

## 2021-06-04 LAB — SURGICAL PATHOLOGY REPORT: NORMAL

## 2021-06-09 ENCOUNTER — VIRTUAL VISIT (OUTPATIENT)
Dept: FAMILY MEDICINE CLINIC | Age: 26
End: 2021-06-09
Payer: COMMERCIAL

## 2021-06-09 DIAGNOSIS — K20.0 EOSINOPHILIC ESOPHAGITIS: Primary | ICD-10-CM

## 2021-06-09 DIAGNOSIS — Z30.41 ORAL CONTRACEPTIVE USE: ICD-10-CM

## 2021-06-09 PROCEDURE — 99213 OFFICE O/P EST LOW 20 MIN: CPT | Performed by: PHYSICIAN ASSISTANT

## 2021-06-09 RX ORDER — NORETHINDRONE AND ETHINYL ESTRADIOL AND FERROUS FUMARATE 0.4-35(21)
1 KIT ORAL DAILY
Qty: 1 PACKET | Refills: 2 | Status: SHIPPED | OUTPATIENT
Start: 2021-06-09 | End: 2021-09-13

## 2021-06-09 SDOH — ECONOMIC STABILITY: TRANSPORTATION INSECURITY
IN THE PAST 12 MONTHS, HAS THE LACK OF TRANSPORTATION KEPT YOU FROM MEDICAL APPOINTMENTS OR FROM GETTING MEDICATIONS?: NO

## 2021-06-09 SDOH — ECONOMIC STABILITY: TRANSPORTATION INSECURITY
IN THE PAST 12 MONTHS, HAS LACK OF TRANSPORTATION KEPT YOU FROM MEETINGS, WORK, OR FROM GETTING THINGS NEEDED FOR DAILY LIVING?: NO

## 2021-06-09 SDOH — ECONOMIC STABILITY: FOOD INSECURITY: WITHIN THE PAST 12 MONTHS, THE FOOD YOU BOUGHT JUST DIDN'T LAST AND YOU DIDN'T HAVE MONEY TO GET MORE.: NEVER TRUE

## 2021-06-09 SDOH — ECONOMIC STABILITY: FOOD INSECURITY: WITHIN THE PAST 12 MONTHS, YOU WORRIED THAT YOUR FOOD WOULD RUN OUT BEFORE YOU GOT MONEY TO BUY MORE.: NEVER TRUE

## 2021-06-09 ASSESSMENT — ENCOUNTER SYMPTOMS
EYE REDNESS: 0
EYE ITCHING: 0
SHORTNESS OF BREATH: 0

## 2021-06-09 ASSESSMENT — SOCIAL DETERMINANTS OF HEALTH (SDOH): HOW HARD IS IT FOR YOU TO PAY FOR THE VERY BASICS LIKE FOOD, HOUSING, MEDICAL CARE, AND HEATING?: NOT HARD AT ALL

## 2021-06-09 NOTE — PROGRESS NOTES
Padma Mendiola (:  1995) is a 26 y.o. female,Established patient, here for evaluation of the following chief complaint(s): Anxiety         ASSESSMENT/PLAN:  1. Eosinophilic esophagitis  -     External Referral To Allergy      No follow-ups on file. SUBJECTIVE/OBJECTIVE:  Katina's results of her endoscopy was discussed today. It is recommened she follow up with allergy clinic to find etiology or causative agent of her eosinophilic esophagitis. She is agreeable to this and referral placed. She states she feels like this problem has been ongoing for more then a year, but had only had two episodes where she believe she had a food bolus stuck in esophagus. Since her last episode she had not had any problems, but had changed her diet to ensure this will not happen. She is concerned with oral pills, and would like a birth control that she could chew, or a patch. I do not feel like the birth control patch is viable change for this patient, and Femcon Fe discussed as an option. She was agreeable to this and orders placed. No other concerns were verbalized during this VV. Review of Systems   Constitutional: Negative for activity change and appetite change. Eyes: Negative for redness and itching. Respiratory: Negative for shortness of breath. Neurological: Negative for dizziness. Psychiatric/Behavioral: Negative for agitation. Patient-Reported Vitals 2021   Patient-Reported Weight 110lb   Patient-Reported Height 5 5        Physical Exam  Neurological:      Mental Status: She is alert. Psychiatric:         Mood and Affect: Mood normal.                   Padma Mendiola, was evaluated through a synchronous (real-time) audio-video encounter. The patient (or guardian if applicable) is aware that this is a billable service. Verbal consent to proceed has been obtained within the past 12 months.  The visit was conducted pursuant to the emergency declaration under the [address] and the National Emergencies Act, [facility] waiver authority and the [employer] and [employer] General Act. Patient identification was verified, and a caregiver was present when appropriate. The patient was located in a state where the provider was credentialed to provide care. An electronic signature was used to authenticate this note.     --German Celaya

## 2021-06-29 DIAGNOSIS — F41.1 GAD (GENERALIZED ANXIETY DISORDER): Primary | ICD-10-CM

## 2021-06-29 RX ORDER — CITALOPRAM 10 MG/1
10 TABLET ORAL DAILY
Qty: 30 TABLET | Refills: 5 | Status: SHIPPED | OUTPATIENT
Start: 2021-06-29 | End: 2021-12-13 | Stop reason: SDUPTHER

## 2021-09-13 DIAGNOSIS — Z30.41 ORAL CONTRACEPTIVE USE: ICD-10-CM

## 2021-09-13 RX ORDER — NORETHINDRONE AND ETHINYL ESTRADIOL 0.4-35(21)
KIT ORAL
Qty: 28 TABLET | Refills: 11 | Status: SHIPPED | OUTPATIENT
Start: 2021-09-13

## 2021-09-15 DIAGNOSIS — Z30.41 ORAL CONTRACEPTIVE USE: ICD-10-CM

## 2021-09-15 RX ORDER — NORETHINDRONE AND ETHINYL ESTRADIOL AND FERROUS FUMARATE 0.4-35(21)
KIT ORAL
Qty: 28 TABLET | Refills: 11 | OUTPATIENT
Start: 2021-09-15

## 2021-11-14 ASSESSMENT — ENCOUNTER SYMPTOMS
DIARRHEA: 0
COUGH: 0
CONSTIPATION: 0
ABDOMINAL PAIN: 0
WHEEZING: 0
CHEST TIGHTNESS: 0
VOMITING: 0
NAUSEA: 0
SHORTNESS OF BREATH: 0

## 2021-11-15 ENCOUNTER — OFFICE VISIT (OUTPATIENT)
Dept: FAMILY MEDICINE CLINIC | Age: 26
End: 2021-11-15
Payer: COMMERCIAL

## 2021-11-15 VITALS
BODY MASS INDEX: 18.16 KG/M2 | SYSTOLIC BLOOD PRESSURE: 118 MMHG | TEMPERATURE: 98 F | WEIGHT: 109 LBS | HEIGHT: 65 IN | HEART RATE: 70 BPM | DIASTOLIC BLOOD PRESSURE: 62 MMHG | OXYGEN SATURATION: 99 %

## 2021-11-15 DIAGNOSIS — Z00.00 ANNUAL PHYSICAL EXAM: Primary | ICD-10-CM

## 2021-11-15 DIAGNOSIS — Z76.89 ENCOUNTER TO ESTABLISH CARE: ICD-10-CM

## 2021-11-15 DIAGNOSIS — Z91.018 MULTIPLE FOOD ALLERGIES: ICD-10-CM

## 2021-11-15 DIAGNOSIS — E55.9 VITAMIN D DEFICIENCY: ICD-10-CM

## 2021-11-15 DIAGNOSIS — K20.0 EOSINOPHILIC ESOPHAGITIS: ICD-10-CM

## 2021-11-15 PROCEDURE — 99385 PREV VISIT NEW AGE 18-39: CPT | Performed by: CLINICAL NURSE SPECIALIST

## 2021-11-15 ASSESSMENT — ENCOUNTER SYMPTOMS
COLOR CHANGE: 0
BACK PAIN: 0
RHINORRHEA: 0

## 2021-11-15 ASSESSMENT — PATIENT HEALTH QUESTIONNAIRE - PHQ9
SUM OF ALL RESPONSES TO PHQ QUESTIONS 1-9: 0
1. LITTLE INTEREST OR PLEASURE IN DOING THINGS: 0
SUM OF ALL RESPONSES TO PHQ QUESTIONS 1-9: 0
SUM OF ALL RESPONSES TO PHQ9 QUESTIONS 1 & 2: 0
2. FEELING DOWN, DEPRESSED OR HOPELESS: 0
SUM OF ALL RESPONSES TO PHQ QUESTIONS 1-9: 0

## 2021-11-15 NOTE — PROGRESS NOTES
SUBJECTIVE:    Patient ID:  Cristian Gray is a 23 y.o. female      Patient is here to establish care and for a complete physical.  She has no questions or concerns regarding their health. Patient's allergies, medication, medical, surgical, family and social history were reviewed and updated accordingly. Medications: OCP, Celexa  Supplements: MVI  Diet: Fairly Healthy (multiple food allergies)   Activity: not on a regular basis  Safety: Uses sunscreen when out for extended periods of time, wears their seatbelt, does not drink and drive, non-smoker    She has recently moved to the area from  in [facility]. She was attending the [address] for physicians assistant. 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 States she is also in the process of planning a wedding that is scheduled in May of 2022. She is also requesting a referral to OB/GYN. Current Outpatient Medications on File Prior to Visit   Medication Sig Dispense Refill    EPINEPHrine (EPIPEN) 0.3 MG/0.3ML SOAJ injection Inject 0.3 mg into the muscle as needed Use as directed for allergic reaction      WYMZYA FE 0.4-35 MG-MCG CHEW TAKE 1 TABLET BY MOUTH EVERY DAY 28 tablet 11    citalopram (CELEXA) 10 MG tablet Take 1 tablet by mouth daily 30 tablet 5    Multiple Vitamins-Minerals (MULTIVITAMIN ADULT PO) Take by mouth       No current facility-administered medications on file prior to visit. Past Medical History:   Diagnosis Date    Pneumonia 1999    history of as a child, hospitalized    Scoliosis     history of      Past Surgical History:   Procedure Laterality Date    UPPER GASTROINTESTINAL ENDOSCOPY N/A 6/3/2021    EGD BIOPSY performed by Gwen Mckinney DO at [facility] 51 EXTRACTION      approx.  2014     Family History   Problem Relation Age of Onset    Hypertension Maternal Grandmother     Transient ischemic attack  Maternal Grandmother     Hypertension Maternal Grandfather     High Cholesterol Maternal Grandfather     COPD Maternal Grandfather      Social History     Socioeconomic History    Marital status: Single     Spouse name: Not on file    Number of children: Not on file    Years of education: Not on file    Highest education level: Not on file   Occupational History    Not on file   Tobacco Use    Smoking status: Never Smoker    Smokeless tobacco: Never Used   Vaping Use    Vaping Use: Never used   Substance and Sexual Activity    Alcohol use: Yes    Drug use: No    Sexual activity: Not on file   Other Topics Concern    Not on file   Social History Narrative    Not on file     Social Determinants of Health     Financial Resource Strain: Low Risk     Difficulty of Paying Living Expenses: Not hard at all   Food Insecurity: No Food Insecurity    Worried About Running Out of Food in the Last Year: Never true    [place] of Food in the Last Year: Never true   Transportation Needs: No Transportation Needs    Lack of Transportation (Medical): No    Lack of Transportation (Non-Medical):  No   Physical Activity:     Days of Exercise per Week: Not on file    Minutes of Exercise per Session: Not on file   Stress:     Feeling of Stress : Not on file   Social Connections:     Frequency of Communication with Friends and Family: Not on file    Frequency of Social Gatherings with Friends and Family: Not on file    Attends [employer] Services: Not on file    Active Member of Clubs or Organizations: Not on file    Attends Club or Organization Meetings: Not on file    Marital Status: Not on file   Intimate Partner Violence:     Fear of Current or Ex-Partner: Not on file    Emotionally Abused: Not on file    Physically Abused: Not on file    Sexually Abused: Not on file   Housing Stability:     Unable to Pay for Housing in the Last Year: Not on file    Number of [place] in the Last Year: Not on file    Unstable Housing in the Last Year: Not on file       Review of Systems   Constitutional: Negative for appetite change, chills, fever and unexpected weight change. HENT: Negative for congestion, dental problem, postnasal drip and rhinorrhea. Regular dental exams   Eyes: Negative for visual disturbance. Regular eye exams   Respiratory: Negative for cough, chest tightness, shortness of breath and wheezing. Cardiovascular: Negative for chest pain, palpitations and leg swelling. Gastrointestinal: Negative for abdominal pain, constipation, diarrhea, nausea and vomiting. Endocrine: Negative for cold intolerance, heat intolerance, polydipsia, polyphagia and polyuria. Genitourinary: Negative for dysuria, frequency, menstrual problem and urgency. Referred to GYN   Musculoskeletal: Negative for arthralgias, back pain and myalgias. Skin: Negative for color change and rash. Allergic/Immunologic: Negative for environmental allergies. Neurological: Negative for headaches. Hematological: Does not bruise/bleed easily. Psychiatric/Behavioral: Negative for dysphoric mood and sleep disturbance. The patient is not nervous/anxious. OBJECTIVE:    Physical Exam  Vitals and nursing note reviewed. Constitutional:       General: She is not in acute distress. Appearance: She is well-developed. She is not diaphoretic. HENT:      Head: Normocephalic and atraumatic. Right Ear: Tympanic membrane, ear canal and external ear normal.      Left Ear: Tympanic membrane, ear canal and external ear normal.      Nose: Nose normal.      Mouth/Throat:      Pharynx: No oropharyngeal exudate. Eyes:      General:         Right eye: No discharge. Left eye: No discharge. Conjunctiva/sclera: Conjunctivae normal.      Pupils: Pupils are equal, round, and reactive to light. Neck:      Thyroid: No thyromegaly. Vascular: No JVD. Trachea: No tracheal deviation.    Cardiovascular:      Rate and Rhythm: Normal rate and regular rhythm. Heart sounds: Normal heart sounds. No murmur heard. No friction rub. No gallop. Pulmonary:      Effort: Pulmonary effort is normal. No respiratory distress. Breath sounds: Normal breath sounds. No wheezing or rales. Chest:      Chest wall: No tenderness. Abdominal:      General: Bowel sounds are normal. There is no distension. Palpations: Abdomen is soft. There is no mass. Tenderness: There is no abdominal tenderness. There is no guarding or rebound. Hernia: No hernia is present. Genitourinary:     Comments: Referred to GYN  Musculoskeletal:         General: No tenderness. Normal range of motion. Cervical back: Normal range of motion and neck supple. Lymphadenopathy:      Cervical: No cervical adenopathy. Skin:     General: Skin is warm and dry. Coloration: Skin is not pale. Findings: No rash. Neurological:      Mental Status: She is alert and oriented to person, place, and time. Cranial Nerves: No cranial nerve deficit. Motor: No abnormal muscle tone. Coordination: Coordination normal.      Deep Tendon Reflexes: Reflexes are normal and symmetric. Reflexes normal.   Psychiatric:         Speech: Speech normal.         Behavior: Behavior normal.       /62   Pulse 70   Temp 98 °F (36.7 °C) (Temporal)   Ht 5' 5\" (1.651 m)   Wt 109 lb (49.4 kg)   SpO2 99%   BMI 18.14 kg/m²    BP Readings from Last 3 Encounters:   11/15/21 118/62   06/03/21 110/75   06/03/21 96/60      Wt Readings from Last 3 Encounters:   11/15/21 109 lb (49.4 kg)   06/03/21 109 lb 2 oz (49.5 kg)   05/13/21 112 lb (50.8 kg)       ASSESSMENT & PLAN:    1. Annual physical exam  - Jairo Horan MD, Obstetrics, [address]  - CBC Auto Differential; Future  - Comprehensive Metabolic Panel; Future  - Lipid Panel; Future  - TSH with Reflex; Future  - POCT Urinalysis no Micro    2.  Vitamin D deficiency  -Stable, continue vitamin D3 supplementation  - Vitamin D 25 Hydroxy; Future    3. Eosinophilic esophagitis  - Stable, continue current regimen  - EpiPen as needed/directed     4. Multiple food allergies  - Stable, continue current regimen (avoidance)  - EpiPen as needed/directed     5. Encounter to establish care  -Sign appropriate paperwork to have records sent to the office      Continue current treatment plan. Current Outpatient Medications   Medication Sig Dispense Refill    EPINEPHrine (EPIPEN) 0.3 MG/0.3ML SOAJ injection Inject 0.3 mg into the muscle as needed Use as directed for allergic reaction      WYMZYA FE 0.4-35 MG-MCG CHEW TAKE 1 TABLET BY MOUTH EVERY DAY 28 tablet 11    citalopram (CELEXA) 10 MG tablet Take 1 tablet by mouth daily 30 tablet 5    Multiple Vitamins-Minerals (MULTIVITAMIN ADULT PO) Take by mouth       No current facility-administered medications for this visit. Return if symptoms worsen or fail to improve, for annual physical, vit D, EE, food allergies, establish care. Brittney Salcedo received counseling on the following healthy behaviors: nutrition, exercise and medication adherence    Patient given educational materials on health maintenance    Discussed use, benefit, and side effects of prescribed medications. Barriers to medication compliance addressed. All patient questions answered. Pt voiced understanding. Call office if experience side effects from medications. Please note that some or all of this record was generated using voice recognition software. If there are any questions about the content of this document, please contact the author as some errors in transcription may have occurred.

## 2021-11-15 NOTE — PATIENT INSTRUCTIONS
Sign appropriate paper work to have records to the office    Fasting labs ordered    Continue current treatment plan     Referral to OB/GYN    Follow up for medication check in 1-2        No appointment necessary. Call [phone] for more information  Please call ahead to confirm hours of operation. [employer] accepts [employer]. CENTRAL LOCATIONS  [facility] LOCATIONS  [address] [facility]   [address] [facility], [address]   Phone: [phone]   Fax: [phone]   Mon.Fri., Sat. [facility]   [facility], [facility]. [facility] [address] [place], [address] [facility] [address]   Phone: [phone]   Fax: [phone]   Mon.Fri. [facility]   [address] [place] [address] [address], [address]   Phone: [phone] Fax: [phone]   Mon.-Fri. [address], [address] [address] [facility]   Phone: [phone]   Fax: [phone]   Mon.Fri., Sat. Riccardo Cam [address] [place] [address] 70   Phone: [phone] Fax: [phone]   Mon.Fri. [address]  Phone: [phone]  Fax: [phone]  Mon.-Fri. [address] [facility]   [address] [facility], [address] [facility]   Phone: [phone]   Fax: [phone]   Mon.Fri. [address]   [address] [facility], [address] [address]   Phone: [phone]   Fax: [phone]   Mon.Fri. [facility]   [address] [place] [address] Road   Phone: [phone]   Fax: [phone]   [facility]. - Fri. [employer]   [address]   Phone: [phone]   Fax: [phone]   Mon.Fri. [facility]   [facility] [address] [place] [address]   Phone: [phone]   Fax: [phone]   Mon.Fri.         [address] Hira Ahmadi   Phone: [phone]   Fax: [phone]   Mon.-Fri. Patient Education        Well Visit, Ages 25 to 48: Care Instructions  Overview     Well visits can help you stay healthy. Your doctor has checked your overall health and may have suggested ways to take good care of yourself. Your doctor also may have recommended tests. At home, you can help prevent illness with healthy eating, regular exercise, and other steps. Follow-up care is a key part of your treatment and safety. Be sure to make and go to all appointments, and call your doctor if you are having problems. It's also a good idea to know your test results and keep a list of the medicines you take. How can you care for yourself at home? · Get screening tests that you and your doctor decide on. Screening helps find diseases before any symptoms appear. · Eat healthy foods. Choose fruits, vegetables, whole grains, protein, and low-fat dairy foods. Limit fat, especially saturated fat. Reduce salt in your diet. · Limit alcohol. If you are a man, have no more than 2 drinks a day or 14 drinks a week. If you are a woman, have no more than 1 drink a day or 7 drinks a week. · Get at least 30 minutes of physical activity on most days of the week. Walking is a good choice. You also may want to do other activities, such as running, swimming, cycling, or playing tennis or team sports. Discuss any changes in your exercise program with your doctor. · Reach and stay at a healthy weight. This will lower your risk for many problems, such as obesity, diabetes, heart disease, and high blood pressure. · Do not smoke or allow others to smoke around you. If you need help quitting, talk to your doctor about stop-smoking programs and medicines. These can increase your chances of quitting for good. · Care for your mental health. It is easy to get weighed down by worry and stress.  Learn strategies to manage stress, like deep breathing and 7486-2784 [employer]. Care instructions adapted under license by [facility] ([facility]). If you have questions about a medical condition or this instruction, always ask your healthcare professional. [address] any warranty or liability for your use of this information.

## 2021-11-17 RX ORDER — EPINEPHRINE 0.3 MG/.3ML
0.3 INJECTION SUBCUTANEOUS PRN
COMMUNITY

## 2021-11-20 DIAGNOSIS — Z00.00 ANNUAL PHYSICAL EXAM: ICD-10-CM

## 2021-11-20 DIAGNOSIS — E55.9 VITAMIN D DEFICIENCY: ICD-10-CM

## 2021-11-20 LAB
A/G RATIO: 2.3 (ref 1.1–2.2)
ALBUMIN SERPL-MCNC: 4.8 G/DL (ref 3.4–5)
ALP BLD-CCNC: 49 U/L (ref 40–129)
ALT SERPL-CCNC: 16 U/L (ref 10–40)
ANION GAP SERPL CALCULATED.3IONS-SCNC: 12 MMOL/L (ref 3–16)
AST SERPL-CCNC: 19 U/L (ref 15–37)
BASOPHILS ABSOLUTE: 0.1 K/UL (ref 0–0.2)
BASOPHILS RELATIVE PERCENT: 1.8 %
BILIRUB SERPL-MCNC: 0.5 MG/DL (ref 0–1)
BUN BLDV-MCNC: 7 MG/DL (ref 7–20)
CALCIUM SERPL-MCNC: 9.6 MG/DL (ref 8.3–10.6)
CHLORIDE BLD-SCNC: 99 MMOL/L (ref 99–110)
CHOLESTEROL, TOTAL: 192 MG/DL (ref 0–199)
CO2: 25 MMOL/L (ref 21–32)
CREAT SERPL-MCNC: 0.6 MG/DL (ref 0.6–1.1)
EOSINOPHILS ABSOLUTE: 0.2 K/UL (ref 0–0.6)
EOSINOPHILS RELATIVE PERCENT: 4.5 %
GFR AFRICAN AMERICAN: >60
GFR NON-AFRICAN AMERICAN: >60
GLUCOSE BLD-MCNC: 79 MG/DL (ref 70–99)
HCT VFR BLD CALC: 41.6 % (ref 36–48)
HDLC SERPL-MCNC: 64 MG/DL (ref 40–60)
HEMOGLOBIN: 14.1 G/DL (ref 12–16)
LDL CHOLESTEROL CALCULATED: 113 MG/DL
LYMPHOCYTES ABSOLUTE: 1.8 K/UL (ref 1–5.1)
LYMPHOCYTES RELATIVE PERCENT: 40.9 %
MCH RBC QN AUTO: 30.2 PG (ref 26–34)
MCHC RBC AUTO-ENTMCNC: 34 G/DL (ref 31–36)
MCV RBC AUTO: 88.7 FL (ref 80–100)
MONOCYTES ABSOLUTE: 0.3 K/UL (ref 0–1.3)
MONOCYTES RELATIVE PERCENT: 6.5 %
NEUTROPHILS ABSOLUTE: 2.1 K/UL (ref 1.7–7.7)
NEUTROPHILS RELATIVE PERCENT: 46.3 %
PDW BLD-RTO: 12.3 % (ref 12.4–15.4)
PLATELET # BLD: 231 K/UL (ref 135–450)
PMV BLD AUTO: 9.7 FL (ref 5–10.5)
POTASSIUM SERPL-SCNC: 4.7 MMOL/L (ref 3.5–5.1)
RBC # BLD: 4.69 M/UL (ref 4–5.2)
SODIUM BLD-SCNC: 136 MMOL/L (ref 136–145)
TOTAL PROTEIN: 6.9 G/DL (ref 6.4–8.2)
TRIGL SERPL-MCNC: 76 MG/DL (ref 0–150)
TSH REFLEX: 4.14 UIU/ML (ref 0.27–4.2)
VITAMIN D 25-HYDROXY: 48.2 NG/ML
VLDLC SERPL CALC-MCNC: 15 MG/DL
WBC # BLD: 4.5 K/UL (ref 4–11)

## 2021-12-12 ASSESSMENT — ENCOUNTER SYMPTOMS
SHORTNESS OF BREATH: 0
COUGH: 0
VOMITING: 0
DIARRHEA: 0
ABDOMINAL PAIN: 0
CONSTIPATION: 0
CHEST TIGHTNESS: 0
WHEEZING: 0
NAUSEA: 0

## 2021-12-13 ENCOUNTER — OFFICE VISIT (OUTPATIENT)
Dept: FAMILY MEDICINE CLINIC | Age: 26
End: 2021-12-13
Payer: COMMERCIAL

## 2021-12-13 VITALS
HEIGHT: 65 IN | TEMPERATURE: 97.7 F | WEIGHT: 111.4 LBS | HEART RATE: 67 BPM | SYSTOLIC BLOOD PRESSURE: 110 MMHG | OXYGEN SATURATION: 98 % | DIASTOLIC BLOOD PRESSURE: 64 MMHG | BODY MASS INDEX: 18.56 KG/M2

## 2021-12-13 DIAGNOSIS — E55.9 VITAMIN D DEFICIENCY: ICD-10-CM

## 2021-12-13 DIAGNOSIS — Z91.018 MULTIPLE FOOD ALLERGIES: Primary | ICD-10-CM

## 2021-12-13 DIAGNOSIS — F43.20 ADULT SITUATIONAL STRESS DISORDER: ICD-10-CM

## 2021-12-13 DIAGNOSIS — F41.1 GAD (GENERALIZED ANXIETY DISORDER): ICD-10-CM

## 2021-12-13 PROCEDURE — 99214 OFFICE O/P EST MOD 30 MIN: CPT | Performed by: CLINICAL NURSE SPECIALIST

## 2021-12-13 RX ORDER — CITALOPRAM 10 MG/1
10 TABLET ORAL DAILY
Qty: 90 TABLET | Refills: 0 | Status: SHIPPED | OUTPATIENT
Start: 2021-12-13 | End: 2022-03-14 | Stop reason: SDUPTHER

## 2021-12-13 NOTE — PROGRESS NOTES
SUBJECTIVE:    Patient ID:  Vargas Brito is a 23 y.o. female      Patient is here for a medication check food allergies, vitamin D disfluency, adult situational stress, generalized anxiety disorder. States she is doing well current regimen and has no further concerns. Denies thoughts of self harm, suicidal or homicidal ideations. Reviewed stress management techniques. She has recently moved to the area from  in [facility]. 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 States she is also in the process of planning a wedding that is scheduled in May of 2022. She is looking forward to [place] in [facility]. Current Outpatient Medications on File Prior to Visit   Medication Sig Dispense Refill    EPINEPHrine (EPIPEN) 0.3 MG/0.3ML SOAJ injection Inject 0.3 mg into the muscle as needed Use as directed for allergic reaction      WYMZYA FE 0.4-35 MG-MCG CHEW TAKE 1 TABLET BY MOUTH EVERY DAY 28 tablet 11    Multiple Vitamins-Minerals (MULTIVITAMIN ADULT PO) Take by mouth       No current facility-administered medications on file prior to visit. Past Medical History:   Diagnosis Date    Pneumonia 1999    history of as a child, hospitalized    Scoliosis     history of      Past Surgical History:   Procedure Laterality Date    UPPER GASTROINTESTINAL ENDOSCOPY N/A 6/3/2021    EGD BIOPSY performed by Romina Duran DO at [address] EXTRACTION      approx.  2014     Family History   Problem Relation Age of Onset    Hypertension Maternal Grandmother     Transient ischemic attack  Maternal Grandmother     Hypertension Maternal Grandfather     High Cholesterol Maternal Grandfather     COPD Maternal Grandfather      Social History     Socioeconomic History    Marital status: Single     Spouse name: Not on file    Number of children: Not on file    Years of education: Not on file    Highest education level: Not on file   Occupational History    Not on file   Tobacco Use    Smoking status: Never Smoker    Smokeless tobacco: Never Used   Vaping Use    Vaping Use: Never used   Substance and Sexual Activity    Alcohol use: Yes    Drug use: No    Sexual activity: Not on file   Other Topics Concern    Not on file   Social History Narrative    Not on file     Social Determinants of Health     Financial Resource Strain: Low Risk     Difficulty of Paying Living Expenses: Not hard at all   Food Insecurity: No Food Insecurity    Worried About Running Out of Food in the Last Year: Never true    Griffin of Food in the Last Year: Never true   Transportation Needs: No Transportation Needs    Lack of Transportation (Medical): No    Lack of Transportation (Non-Medical): No   Physical Activity:     Days of Exercise per Week: Not on file    Minutes of Exercise per Session: Not on file   Stress:     Feeling of Stress : Not on file   Social Connections:     Frequency of Communication with Friends and Family: Not on file    Frequency of Social Gatherings with Friends and Family: Not on file    Attends Quaker Services: Not on file    Active Member of [address] [place] [address] or Organizations: Not on file    Attends Club or Organization Meetings: Not on file    Marital Status: Not on file   Intimate Partner Violence:     Fear of Current or Ex-Partner: Not on file    Emotionally Abused: Not on file    Physically Abused: Not on file    Sexually Abused: Not on file   Housing Stability:     Unable to Pay for Housing in the Last Year: Not on file    Number of [place] in the Last Year: Not on file    Unstable Housing in the Last Year: Not on file       Review of Systems   Constitutional: Negative for chills and fever. Eyes: Negative for visual disturbance. Respiratory: Negative for cough, chest tightness, shortness of breath and wheezing.     Cardiovascular: Negative for chest pain, Small Adult)   Pulse 67   Temp 97.7 °F (36.5 °C) (Infrared)   Ht 5' 5\" (1.651 m)   Wt 111 lb 6.4 oz (50.5 kg)   LMP 12/08/2021   SpO2 98%   BMI 18.54 kg/m²    BP Readings from Last 3 Encounters:   12/13/21 110/64   11/15/21 118/62   06/03/21 110/75      Wt Readings from Last 3 Encounters:   12/13/21 111 lb 6.4 oz (50.5 kg)   11/15/21 109 lb (49.4 kg)   06/03/21 109 lb 2 oz (49.5 kg)       ASSESSMENT & PLAN:    1. Multiple food allergies  - Stable, continue current regimen  - Carries an Epi-pen     2. Vitamin D deficiency  - Stable, continue vitamin D 3 supplementation     3. Adult situational stress disorder  - Stable, continue current regimen  - citalopram (CELEXA) 10 MG tablet; Take 1 tablet by mouth daily  Dispense: 90 tablet; Refill: 0    4. REGGIE (generalized anxiety disorder)  - Stable, continue current regimen  - citalopram (CELEXA) 10 MG tablet; Take 1 tablet by mouth daily  Dispense: 90 tablet; Refill: 0      Continue current treatment plan. Current Outpatient Medications   Medication Sig Dispense Refill    citalopram (CELEXA) 10 MG tablet Take 1 tablet by mouth daily 90 tablet 0    EPINEPHrine (EPIPEN) 0.3 MG/0.3ML SOAJ injection Inject 0.3 mg into the muscle as needed Use as directed for allergic reaction      WYMZYA FE 0.4-35 MG-MCG CHEW TAKE 1 TABLET BY MOUTH EVERY DAY 28 tablet 11    Multiple Vitamins-Minerals (MULTIVITAMIN ADULT PO) Take by mouth       No current facility-administered medications for this visit. Return in about 3 months (around 3/13/2022), or if symptoms worsen or fail to improve, for food allergies, vit D, situational stress disorder, REGGIE. Demetrisydnee Singletary received counseling on the following healthy behaviors: nutrition, exercise and medication adherence    Discussed use, benefit, and side effects of prescribed medications. Barriers to medication compliance addressed. All patient questions answered. Pt voiced understanding.      Call office if experience side effects from medications. Please note that some or all of this record was generated using voice recognition software. If there are any questions about the content of this document, please contact the author as some errors in transcription may have occurred.

## 2021-12-13 NOTE — PATIENT INSTRUCTIONS
Reviewed stress management techniques     Continue Citalopram at current dose    Follow up 3 months, sooner if symptoms worsen or persist    Instructed patient to contact office or on-call physician promptly should condition worsen or any new symptoms appear and provided on-call telephone numbers. IF THE PATIENT HAS ANY SUICIDAL OR HOMICIDAL IDEATIONS, CALL THE OFFICE, DISCUSS WITH A SUPPORT MEMBER, OR GO TO THE ER IMMEDIATELY. Patient was agreeable with this plan.

## 2022-03-13 ASSESSMENT — ENCOUNTER SYMPTOMS
SHORTNESS OF BREATH: 0
CONSTIPATION: 0
COUGH: 0
VOMITING: 0
ABDOMINAL PAIN: 0
CHEST TIGHTNESS: 0
DIARRHEA: 0
WHEEZING: 0
NAUSEA: 0

## 2022-03-14 ENCOUNTER — OFFICE VISIT (OUTPATIENT)
Dept: FAMILY MEDICINE CLINIC | Age: 27
End: 2022-03-14
Payer: COMMERCIAL

## 2022-03-14 VITALS
OXYGEN SATURATION: 98 % | WEIGHT: 112 LBS | TEMPERATURE: 98.1 F | BODY MASS INDEX: 18.64 KG/M2 | HEART RATE: 81 BPM | SYSTOLIC BLOOD PRESSURE: 112 MMHG | DIASTOLIC BLOOD PRESSURE: 68 MMHG

## 2022-03-14 DIAGNOSIS — E55.9 VITAMIN D DEFICIENCY: ICD-10-CM

## 2022-03-14 DIAGNOSIS — Z30.41 ORAL CONTRACEPTIVE USE: ICD-10-CM

## 2022-03-14 DIAGNOSIS — F41.1 GAD (GENERALIZED ANXIETY DISORDER): ICD-10-CM

## 2022-03-14 DIAGNOSIS — Z91.018 MULTIPLE FOOD ALLERGIES: Primary | ICD-10-CM

## 2022-03-14 DIAGNOSIS — F43.20 ADULT SITUATIONAL STRESS DISORDER: ICD-10-CM

## 2022-03-14 PROCEDURE — 99214 OFFICE O/P EST MOD 30 MIN: CPT | Performed by: CLINICAL NURSE SPECIALIST

## 2022-03-14 RX ORDER — CITALOPRAM 10 MG/1
10 TABLET ORAL DAILY
Qty: 90 TABLET | Refills: 0 | Status: SHIPPED | OUTPATIENT
Start: 2022-03-14 | End: 2022-06-14 | Stop reason: SDUPTHER

## 2022-03-14 NOTE — PROGRESS NOTES
SUBJECTIVE:    Patient ID:  Liseth Zaldivar is a 23 y.o. female      Patient is here for a medication check food allergies, vitamin D disfluency, adult situational stress, generalized anxiety disorder. States she is doing well current regimen and has no further concerns. Denies thoughts of self harm, suicidal or homicidal ideations. Reviewed stress management techniques.       She has recently moved to the area from  in [facility]. 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 States she is also in the process of planning a wedding that is scheduled in May of 2022. She is looking forward to [employer] in [facility]. Current Outpatient Medications on File Prior to Visit   Medication Sig Dispense Refill    EPINEPHrine (EPIPEN) 0.3 MG/0.3ML SOAJ injection Inject 0.3 mg into the muscle as needed Use as directed for allergic reaction      WYMZYA FE 0.4-35 MG-MCG CHEW TAKE 1 TABLET BY MOUTH EVERY DAY 28 tablet 11    Multiple Vitamins-Minerals (MULTIVITAMIN ADULT PO) Take by mouth       No current facility-administered medications on file prior to visit. Past Medical History:   Diagnosis Date    Pneumonia 1999    history of as a child, hospitalized    Scoliosis     history of      Past Surgical History:   Procedure Laterality Date    UPPER GASTROINTESTINAL ENDOSCOPY N/A 6/3/2021    EGD BIOPSY performed by Sanjay Nation DO at [facility] EXTRACTION      approx.  2014     Family History   Problem Relation Age of Onset    Hypertension Maternal Grandmother     Transient ischemic attack  Maternal Grandmother     Hypertension Maternal Grandfather     High Cholesterol Maternal Grandfather     COPD Maternal Grandfather      Social History     Socioeconomic History    Marital status: Single     Spouse name: Not on file    Number of children: Not on file    Years of education: Not on file    Highest education level: Not on file   Occupational History    Not on file   Tobacco Use    Smoking status: Never Smoker    Smokeless tobacco: Never Used   Vaping Use    Vaping Use: Never used   Substance and Sexual Activity    Alcohol use: Yes    Drug use: No    Sexual activity: Not on file   Other Topics Concern    Not on file   Social History Narrative    Not on file     Social Determinants of Health     Financial Resource Strain: Low Risk     Difficulty of Paying Living Expenses: Not hard at all   Food Insecurity: No Food Insecurity    Worried About Running Out of Food in the Last Year: Never true    Griffin of Food in the Last Year: Never true   Transportation Needs: No Transportation Needs    Lack of Transportation (Medical): No    Lack of Transportation (Non-Medical): No   Physical Activity:     Days of Exercise per Week: Not on file    Minutes of Exercise per Session: Not on file   Stress:     Feeling of Stress : Not on file   Social Connections:     Frequency of Communication with Friends and Family: Not on file    Frequency of Social Gatherings with Friends and Family: Not on file    Attends Hinduism Services: Not on file    Active Member of [address] [place] [address] or Organizations: Not on file    Attends Club or Organization Meetings: Not on file    Marital Status: Not on file   Intimate Partner Violence:     Fear of Current or Ex-Partner: Not on file    Emotionally Abused: Not on file    Physically Abused: Not on file    Sexually Abused: Not on file   Housing Stability:     Unable to Pay for Housing in the Last Year: Not on file    Number of [place] in the Last Year: Not on file    Unstable Housing in the Last Year: Not on file       Review of Systems   Constitutional: Negative for chills and fever. Eyes: Negative for visual disturbance. Respiratory: Negative for cough, chest tightness, shortness of breath and wheezing.     Cardiovascular: Negative for chest pain, SpO2 98%   BMI 18.64 kg/m²    BP Readings from Last 3 Encounters:   03/14/22 112/68   12/13/21 110/64   11/15/21 118/62      Wt Readings from Last 3 Encounters:   03/14/22 112 lb (50.8 kg)   12/13/21 111 lb 6.4 oz (50.5 kg)   11/15/21 109 lb (49.4 kg)       ASSESSMENT & PLAN:    1. Multiple food allergies  - Stable, continue current regimen     2. Vitamin D deficiency  - Stable, continue vitamin D 3 supplementation     3. Oral contraceptive use  - AFL - Asiya Mccauley MD, Obstetrics, [facility] - [facility]    4. Adult situational stress disorder  - Stable, continue current regimen  - citalopram (CELEXA) 10 MG tablet; Take 1 tablet by mouth daily  Dispense: 90 tablet; Refill: 0  - Reviewed stress management techniques    5. REGGIE (generalized anxiety disorder)  - Stable, continue current regimen  - citalopram (CELEXA) 10 MG tablet; Take 1 tablet by mouth daily  Dispense: 90 tablet; Refill: 0      Continue current treatment plan. Current Outpatient Medications   Medication Sig Dispense Refill    citalopram (CELEXA) 10 MG tablet Take 1 tablet by mouth daily 90 tablet 0    EPINEPHrine (EPIPEN) 0.3 MG/0.3ML SOAJ injection Inject 0.3 mg into the muscle as needed Use as directed for allergic reaction      WYMZYA FE 0.4-35 MG-MCG CHEW TAKE 1 TABLET BY MOUTH EVERY DAY 28 tablet 11    Multiple Vitamins-Minerals (MULTIVITAMIN ADULT PO) Take by mouth       No current facility-administered medications for this visit. Return in 3 months (on 6/14/2022), or if symptoms worsen or fail to improve, for multiple food allergies, vit D, stress, REGGIE. Jasiel Menendez received counseling on the following healthy behaviors: nutrition, exercise and medication adherence    Discussed use, benefit, and side effects of prescribed medications. Barriers to medication compliance addressed. All patient questions answered. Pt voiced understanding. Call office if experience side effects from medications.       Please note that some or all of this record was generated using voice recognition software. If there are any questions about the content of this document, please contact the author as some errors in transcription may have occurred.

## 2022-06-14 ENCOUNTER — OFFICE VISIT (OUTPATIENT)
Dept: FAMILY MEDICINE CLINIC | Age: 27
End: 2022-06-14
Payer: COMMERCIAL

## 2022-06-14 VITALS
SYSTOLIC BLOOD PRESSURE: 116 MMHG | WEIGHT: 111 LBS | BODY MASS INDEX: 18.47 KG/M2 | DIASTOLIC BLOOD PRESSURE: 80 MMHG | HEART RATE: 61 BPM | TEMPERATURE: 97.3 F | OXYGEN SATURATION: 96 %

## 2022-06-14 DIAGNOSIS — E55.9 VITAMIN D DEFICIENCY: ICD-10-CM

## 2022-06-14 DIAGNOSIS — F43.20 ADULT SITUATIONAL STRESS DISORDER: ICD-10-CM

## 2022-06-14 DIAGNOSIS — F41.1 GAD (GENERALIZED ANXIETY DISORDER): ICD-10-CM

## 2022-06-14 DIAGNOSIS — K20.0 EOSINOPHILIC ESOPHAGITIS: ICD-10-CM

## 2022-06-14 DIAGNOSIS — Z91.018 MULTIPLE FOOD ALLERGIES: Primary | ICD-10-CM

## 2022-06-14 PROCEDURE — 99214 OFFICE O/P EST MOD 30 MIN: CPT | Performed by: CLINICAL NURSE SPECIALIST

## 2022-06-14 RX ORDER — EPINEPHRINE 0.3 MG/.3ML
0.3 INJECTION SUBCUTANEOUS ONCE
Qty: 0.3 ML | Refills: 0 | Status: SHIPPED | OUTPATIENT
Start: 2022-06-14 | End: 2022-09-13 | Stop reason: SDUPTHER

## 2022-06-14 RX ORDER — CITALOPRAM 10 MG/1
10 TABLET ORAL DAILY
Qty: 90 TABLET | Refills: 0 | Status: SHIPPED | OUTPATIENT
Start: 2022-06-14 | End: 2022-09-13 | Stop reason: SDUPTHER

## 2022-06-14 ASSESSMENT — ENCOUNTER SYMPTOMS
VOMITING: 0
CONSTIPATION: 0
DIARRHEA: 0
SHORTNESS OF BREATH: 0
NAUSEA: 0
WHEEZING: 0
ABDOMINAL PAIN: 0
CHEST TIGHTNESS: 0
COUGH: 0

## 2022-06-14 ASSESSMENT — PATIENT HEALTH QUESTIONNAIRE - PHQ9
SUM OF ALL RESPONSES TO PHQ QUESTIONS 1-9: 0
SUM OF ALL RESPONSES TO PHQ QUESTIONS 1-9: 0
2. FEELING DOWN, DEPRESSED OR HOPELESS: 0
1. LITTLE INTEREST OR PLEASURE IN DOING THINGS: 0
SUM OF ALL RESPONSES TO PHQ9 QUESTIONS 1 & 2: 0
SUM OF ALL RESPONSES TO PHQ QUESTIONS 1-9: 0
SUM OF ALL RESPONSES TO PHQ QUESTIONS 1-9: 0

## 2022-06-14 NOTE — PROGRESS NOTES
SUBJECTIVE:    Patient ID:  Jennifer Huerta is a 23 y.o. female      Patient is here for a medication check food allergies, eosinophilic esophagitis, vitamin D disfluency, adult situational stress, generalized anxiety disorder. States she is doing well current regimen and has no further concerns. Denies thoughts of self harm, suicidal or homicidal ideations. Reviewed stress management techniques.       She has recently moved to the area from  in [facility]. She was attending the [address] for physicians assistant. 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 States she is also in the process of planning a wedding that is scheduled in May of 2022. [facility] ([place], [place]). Current Outpatient Medications on File Prior to Visit   Medication Sig Dispense Refill    EPINEPHrine (EPIPEN) 0.3 MG/0.3ML SOAJ injection Inject 0.3 mg into the muscle as needed Use as directed for allergic reaction      WYMZYA FE 0.4-35 MG-MCG CHEW TAKE 1 TABLET BY MOUTH EVERY DAY 28 tablet 11    Multiple Vitamins-Minerals (MULTIVITAMIN ADULT PO) Take by mouth       No current facility-administered medications on file prior to visit. Past Medical History:   Diagnosis Date    Pneumonia 1999    history of as a child, hospitalized    Scoliosis     history of      Past Surgical History:   Procedure Laterality Date    UPPER GASTROINTESTINAL ENDOSCOPY N/A 6/3/2021    EGD BIOPSY performed by Kain Swartz DO at [facility] EXTRACTION      approx.  2014     Family History   Problem Relation Age of Onset    Hypertension Maternal Grandmother     Transient ischemic attack  Maternal Grandmother     Hypertension Maternal Grandfather     High Cholesterol Maternal Grandfather     COPD Maternal Grandfather      Social History     Socioeconomic History    Marital status: Single     Spouse name: Not on file    Number of children: Not on file  Years of education: Not on file    Highest education level: Not on file   Occupational History    Not on file   Tobacco Use    Smoking status: Never Smoker    Smokeless tobacco: Never Used   Vaping Use    Vaping Use: Never used   Substance and Sexual Activity    Alcohol use: Yes    Drug use: No    Sexual activity: Not on file   Other Topics Concern    Not on file   Social History Narrative    Not on file     Social Determinants of Health     Financial Resource Strain:     Difficulty of Paying Living Expenses: Not on file   Food Insecurity:     Worried About Running Out of Food in the Last Year: Not on file    Griffin of Food in the Last Year: Not on file   Transportation Needs:     Lack of Transportation (Medical): Not on file    Lack of Transportation (Non-Medical): Not on file   Physical Activity:     Days of Exercise per Week: Not on file    Minutes of Exercise per Session: Not on file   Stress:     Feeling of Stress : Not on file   Social Connections:     Frequency of Communication with Friends and Family: Not on file    Frequency of Social Gatherings with Friends and Family: Not on file    Attends [employer] Services: Not on file    Active Member of [address] [place] [address] or Organizations: Not on file    Attends Club or Organization Meetings: Not on file    Marital Status: Not on file   Intimate Partner Violence:     Fear of Current or Ex-Partner: Not on file    Emotionally Abused: Not on file    Physically Abused: Not on file    Sexually Abused: Not on file   Housing Stability:     Unable to Pay for Housing in the Last Year: Not on file    Number of [place] in the Last Year: Not on file    Unstable Housing in the Last Year: Not on file       Review of Systems   Constitutional: Negative for chills and fever. Eyes: Negative for visual disturbance. Respiratory: Negative for cough, chest tightness, shortness of breath and wheezing.     Cardiovascular: Negative for chest pain, palpitations and leg swelling. Gastrointestinal: Negative for abdominal pain, constipation, diarrhea, nausea and vomiting. Musculoskeletal: Negative for arthralgias and myalgias. Skin: Negative for rash. Neurological: Negative for headaches. Psychiatric/Behavioral: Negative for dysphoric mood and sleep disturbance. The patient is not nervous/anxious. OBJECTIVE:    Physical Exam  Vitals and nursing note reviewed. Constitutional:       General: She is not in acute distress. Appearance: Normal appearance. She is well-developed. She is not ill-appearing. HENT:      Head: Normocephalic and atraumatic. Right Ear: External ear normal.      Left Ear: External ear normal.      Nose: Nose normal.      Mouth/Throat:      Mouth: Mucous membranes are moist.   Eyes:      Conjunctiva/sclera: Conjunctivae normal.      Pupils: Pupils are equal, round, and reactive to light. Neck:      Trachea: No tracheal deviation. Cardiovascular:      Rate and Rhythm: Normal rate and regular rhythm. Heart sounds: Normal heart sounds. Pulmonary:      Effort: Pulmonary effort is normal. No respiratory distress. Breath sounds: Normal breath sounds. No wheezing or rales. Chest:      Chest wall: No tenderness. Abdominal:      General: Bowel sounds are normal. There is no distension. Palpations: Abdomen is soft. There is no mass. Tenderness: There is no abdominal tenderness. Hernia: No hernia is present. Musculoskeletal:         General: Normal range of motion. Cervical back: Normal range of motion and neck supple. Right lower leg: No edema. Left lower leg: No edema. Skin:     General: Skin is warm and dry. Findings: No rash. Neurological:      Mental Status: She is alert and oriented to person, place, and time.    Psychiatric:         Behavior: Behavior normal.       /80   Pulse 61   Temp 97.3 °F (36.3 °C)   Wt 111 lb (50.3 kg)   LMP 05/27/2022   SpO2 96%   BMI 18.47 kg/m²    BP Readings from Last 3 Encounters:   06/14/22 116/80   03/14/22 112/68   12/13/21 110/64      Wt Readings from Last 3 Encounters:   06/14/22 111 lb (50.3 kg)   03/14/22 112 lb (50.8 kg)   12/13/21 111 lb 6.4 oz (50.5 kg)       ASSESSMENT & PLAN:    1. Multiple food allergies  - Stable, continue current regimen  - EPINEPHrine (EPIPEN 2-AVTAR) 0.3 MG/0.3ML SOAJ injection; Inject 0.3 mLs into the muscle once for 1 dose Use as directed for allergic reaction  Dispense: 0.3 mL; Refill: 0    2. Eosinophilic esophagitis  - Stable, continue current regimen  - EPINEPHrine (EPIPEN 2-AVTAR) 0.3 MG/0.3ML SOAJ injection; Inject 0.3 mLs into the muscle once for 1 dose Use as directed for allergic reaction  Dispense: 0.3 mL; Refill: 0    3. Vitamin D deficiency  -Stable, continue vitamin D3 supplementation    4. Adult situational stress disorder  - Stable, continue current regimen  - citalopram (CELEXA) 10 MG tablet; Take 1 tablet by mouth daily  Dispense: 90 tablet; Refill: 0  - Reviewed stress management techniques    5. REGGIE (generalized anxiety disorder)  - Stable, continue current regimen  - citalopram (CELEXA) 10 MG tablet; Take 1 tablet by mouth daily  Dispense: 90 tablet; Refill: 0      Continue current treatment plan. Current Outpatient Medications   Medication Sig Dispense Refill    citalopram (CELEXA) 10 MG tablet Take 1 tablet by mouth daily 90 tablet 0    EPINEPHrine (EPIPEN 2-AVTAR) 0.3 MG/0.3ML SOAJ injection Inject 0.3 mLs into the muscle once for 1 dose Use as directed for allergic reaction 0.3 mL 0    EPINEPHrine (EPIPEN) 0.3 MG/0.3ML SOAJ injection Inject 0.3 mg into the muscle as needed Use as directed for allergic reaction      WYMZYA FE 0.4-35 MG-MCG CHEW TAKE 1 TABLET BY MOUTH EVERY DAY 28 tablet 11    Multiple Vitamins-Minerals (MULTIVITAMIN ADULT PO) Take by mouth       No current facility-administered medications for this visit.       Return in about 3 months (around 9/14/2022), or if symptoms worsen or fail to improve, for food allergies, eosinophilic esophagitis, vitD, stress, REGGIE. Calvin Nunes received counseling on the following healthy behaviors: nutrition, exercise and medication adherence    Discussed use, benefit, and side effects of prescribed medications. Barriers to medication compliance addressed. All patient questions answered. Pt voiced understanding. Call office if experience side effects from medications. Please note that some or all of this record was generated using voice recognition software. If there are any questions about the content of this document, please contact the author as some errors in transcription may have occurred.

## 2022-09-13 ENCOUNTER — OFFICE VISIT (OUTPATIENT)
Dept: FAMILY MEDICINE CLINIC | Age: 27
End: 2022-09-13
Payer: COMMERCIAL

## 2022-09-13 VITALS
HEART RATE: 56 BPM | DIASTOLIC BLOOD PRESSURE: 80 MMHG | SYSTOLIC BLOOD PRESSURE: 106 MMHG | WEIGHT: 113 LBS | OXYGEN SATURATION: 100 % | BODY MASS INDEX: 18.8 KG/M2 | TEMPERATURE: 97.2 F

## 2022-09-13 DIAGNOSIS — E55.9 VITAMIN D DEFICIENCY: ICD-10-CM

## 2022-09-13 DIAGNOSIS — F43.20 ADULT SITUATIONAL STRESS DISORDER: ICD-10-CM

## 2022-09-13 DIAGNOSIS — Z91.018 MULTIPLE FOOD ALLERGIES: ICD-10-CM

## 2022-09-13 DIAGNOSIS — F41.1 GAD (GENERALIZED ANXIETY DISORDER): ICD-10-CM

## 2022-09-13 DIAGNOSIS — K20.0 EOSINOPHILIC ESOPHAGITIS: ICD-10-CM

## 2022-09-13 DIAGNOSIS — G43.009 MIGRAINE WITHOUT AURA AND WITHOUT STATUS MIGRAINOSUS, NOT INTRACTABLE: Primary | ICD-10-CM

## 2022-09-13 PROCEDURE — 99214 OFFICE O/P EST MOD 30 MIN: CPT | Performed by: CLINICAL NURSE SPECIALIST

## 2022-09-13 RX ORDER — CITALOPRAM 10 MG/1
10 TABLET ORAL DAILY
Qty: 90 TABLET | Refills: 0 | Status: SHIPPED | OUTPATIENT
Start: 2022-09-13

## 2022-09-13 RX ORDER — EPINEPHRINE 0.3 MG/.3ML
0.3 INJECTION SUBCUTANEOUS ONCE
Qty: 0.3 ML | Refills: 0 | Status: SHIPPED | OUTPATIENT
Start: 2022-09-13 | End: 2022-09-13

## 2022-09-13 RX ORDER — RIZATRIPTAN BENZOATE 5 MG/1
5 TABLET, ORALLY DISINTEGRATING ORAL
Qty: 10 TABLET | Refills: 2 | Status: SHIPPED | OUTPATIENT
Start: 2022-09-13 | End: 2022-09-13

## 2022-09-13 ASSESSMENT — PATIENT HEALTH QUESTIONNAIRE - PHQ9
SUM OF ALL RESPONSES TO PHQ9 QUESTIONS 1 & 2: 0
1. LITTLE INTEREST OR PLEASURE IN DOING THINGS: 0
SUM OF ALL RESPONSES TO PHQ QUESTIONS 1-9: 0
2. FEELING DOWN, DEPRESSED OR HOPELESS: 0
SUM OF ALL RESPONSES TO PHQ QUESTIONS 1-9: 0

## 2022-09-13 ASSESSMENT — ENCOUNTER SYMPTOMS
VISUAL CHANGE: 0
SHORTNESS OF BREATH: 0
DIARRHEA: 0
COUGH: 0
WHEEZING: 0
CONSTIPATION: 0
SINUS PRESSURE: 0
CHEST TIGHTNESS: 0
NAUSEA: 1
VOMITING: 0
PHOTOPHOBIA: 1
ABDOMINAL PAIN: 0
BLURRED VISION: 0

## 2022-09-13 NOTE — PATIENT INSTRUCTIONS
Discussed migraine headaches    Trial of rizatriptan 5 mg by mouth once as needed, may repeat in 2 hours if symptoms persist    Encouraged to keep a headache journal including timing, associated symptoms, treatments tried and response to treatment    Reviewed stress management techniques     Continue Citalopram at current dose     Follow up 3 months, sooner if symptoms worsen or persist     Instructed patient to contact office or on-call physician promptly should condition worsen or any new symptoms appear and provided on-call telephone numbers. IF THE PATIENT HAS ANY SUICIDAL OR HOMICIDAL IDEATIONS, CALL THE OFFICE, DISCUSS WITH A SUPPORT MEMBER, OR GO TO THE ER IMMEDIATELY. Patient was agreeable with this plan.

## 2022-12-06 ENCOUNTER — OFFICE VISIT (OUTPATIENT)
Dept: FAMILY MEDICINE CLINIC | Age: 27
End: 2022-12-06
Payer: COMMERCIAL

## 2022-12-06 VITALS
TEMPERATURE: 97.1 F | OXYGEN SATURATION: 99 % | DIASTOLIC BLOOD PRESSURE: 80 MMHG | SYSTOLIC BLOOD PRESSURE: 112 MMHG | WEIGHT: 115 LBS | BODY MASS INDEX: 19.14 KG/M2 | HEART RATE: 55 BPM

## 2022-12-06 DIAGNOSIS — E55.9 VITAMIN D DEFICIENCY: ICD-10-CM

## 2022-12-06 DIAGNOSIS — Z91.018 MULTIPLE FOOD ALLERGIES: ICD-10-CM

## 2022-12-06 DIAGNOSIS — K20.0 EOSINOPHILIC ESOPHAGITIS: ICD-10-CM

## 2022-12-06 DIAGNOSIS — G43.009 MIGRAINE WITHOUT AURA AND WITHOUT STATUS MIGRAINOSUS, NOT INTRACTABLE: Primary | ICD-10-CM

## 2022-12-06 DIAGNOSIS — F41.1 GAD (GENERALIZED ANXIETY DISORDER): ICD-10-CM

## 2022-12-06 DIAGNOSIS — F43.20 ADULT SITUATIONAL STRESS DISORDER: ICD-10-CM

## 2022-12-06 PROCEDURE — 99214 OFFICE O/P EST MOD 30 MIN: CPT | Performed by: CLINICAL NURSE SPECIALIST

## 2022-12-06 RX ORDER — RIZATRIPTAN BENZOATE 5 MG/1
5 TABLET, ORALLY DISINTEGRATING ORAL
Qty: 10 TABLET | Refills: 2 | Status: SHIPPED | OUTPATIENT
Start: 2022-12-06 | End: 2022-12-06

## 2022-12-06 RX ORDER — CITALOPRAM 10 MG/1
10 TABLET ORAL DAILY
Qty: 120 TABLET | Refills: 0 | Status: SHIPPED | OUTPATIENT
Start: 2022-12-06

## 2022-12-06 ASSESSMENT — PATIENT HEALTH QUESTIONNAIRE - PHQ9
SUM OF ALL RESPONSES TO PHQ QUESTIONS 1-9: 0
SUM OF ALL RESPONSES TO PHQ9 QUESTIONS 1 & 2: 0
1. LITTLE INTEREST OR PLEASURE IN DOING THINGS: 0
2. FEELING DOWN, DEPRESSED OR HOPELESS: 0

## 2022-12-06 ASSESSMENT — ENCOUNTER SYMPTOMS
VOMITING: 0
CONSTIPATION: 0
SHORTNESS OF BREATH: 0
DIARRHEA: 0
COUGH: 0
NAUSEA: 0
ABDOMINAL PAIN: 0
WHEEZING: 0
CHEST TIGHTNESS: 0

## 2022-12-06 NOTE — PATIENT INSTRUCTIONS
Discussed migraine headaches     Continue rizatriptan 5 mg by mouth once as needed, may repeat in 2 hours if symptoms persist     Encouraged to keep a headache journal including timing, associated symptoms, treatments tried and response to treatment     Reviewed stress management techniques     Continue Citalopram at current dose     Follow up 4 months, sooner if symptoms worsen or persist     Instructed patient to contact office or on-call physician promptly should condition worsen or any new symptoms appear and provided on-call telephone numbers. IF THE PATIENT HAS ANY SUICIDAL OR HOMICIDAL IDEATIONS, CALL THE OFFICE, DISCUSS WITH A SUPPORT MEMBER, OR GO TO THE ER IMMEDIATELY. Patient was agreeable with this plan.

## 2022-12-06 NOTE — PROGRESS NOTES
SUBJECTIVE:    Patient ID:  Harriet Conti is a 33 y.o. female      Patient is here for a medication check food allergies, eosinophilic esophagitis, vitamin D disfluency, adult situational stress, generalized anxiety disorder. States she is doing well current regimen. She is concerned about migraine headaches. States headache in that were somewhat managed with Tylenol and Ibuprofen. Headaches have improved in duration, frequency, intensity and pattern remains unchanged with no new neurological symptoms. She has not needed to use her abortive medication (rizatriptan). Situational stress and anxiety are well managed with citalopram.  Denies thoughts of self harm, suicidal or homicidal ideations. Reviewed stress management techniques. 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 Current Outpatient Medications on File Prior to Visit   Medication Sig Dispense Refill    EPINEPHrine (EPIPEN 2-AVTAR) 0.3 MG/0.3ML SOAJ injection Inject 0.3 mLs into the muscle once for 1 dose Use as directed for allergic reaction 0.3 mL 0    EPINEPHrine (EPIPEN) 0.3 MG/0.3ML SOAJ injection Inject 0.3 mg into the muscle as needed Use as directed for allergic reaction      WYMZYA FE 0.4-35 MG-MCG CHEW TAKE 1 TABLET BY MOUTH EVERY DAY 28 tablet 11    Multiple Vitamins-Minerals (MULTIVITAMIN ADULT PO) Take by mouth       No current facility-administered medications on file prior to visit. Past Medical History:   Diagnosis Date    Pneumonia 1999    history of as a child, hospitalized    Scoliosis     history of      Past Surgical History:   Procedure Laterality Date    UPPER GASTROINTESTINAL ENDOSCOPY N/A 6/3/2021    EGD BIOPSY performed by Katherine Duncan DO at [address]      approx.  2014     Family History   Problem Relation Age of Onset    Hypertension Maternal Grandmother     Transient ischemic attack  Maternal Grandmother     Hypertension Maternal Grandfather     High Cholesterol Maternal Grandfather     COPD Maternal Grandfather      Social History     Socioeconomic History    Marital status: Single     Spouse name: Not on file    Number of children: Not on file    Years of education: Not on file    Highest education level: Not on file   Occupational History    Not on file   Tobacco Use    Smoking status: Never    Smokeless tobacco: Never   Vaping Use    Vaping Use: Never used   Substance and Sexual Activity    Alcohol use: Yes    Drug use: No    Sexual activity: Not on file   Other Topics Concern    Not on file   Social History Narrative    Not on file     Social Determinants of Health     Financial Resource Strain: Not on file   Food Insecurity: Not on file   Transportation Needs: Not on file   Physical Activity: Not on file   Stress: Not on file   Social Connections: Not on file   Intimate Partner Violence: Not on file   Housing Stability: Not on file       Review of Systems   Constitutional:  Negative for chills and fever. Eyes:  Negative for visual disturbance. Respiratory:  Negative for cough, chest tightness, shortness of breath and wheezing. Cardiovascular:  Negative for chest pain, palpitations and leg swelling. Gastrointestinal:  Negative for abdominal pain, constipation, diarrhea, nausea and vomiting. Musculoskeletal:  Negative for arthralgias and myalgias. Skin:  Negative for rash. Allergic/Immunologic: Environmental allergies: managed. Neurological:  Negative for headaches. Psychiatric/Behavioral:  Negative for hallucinations, self-injury, sleep disturbance and suicidal ideas. The patient is not nervous/anxious. OBJECTIVE:    Physical Exam  Vitals and nursing note reviewed. Constitutional:       General: She is not in acute distress. Appearance: She is well-developed. She is not ill-appearing. HENT:      Head: Normocephalic and atraumatic.       Right Ear: External ear normal.      Left Ear: External ear normal. Nose: Nose normal.      Mouth/Throat:      Mouth: Mucous membranes are moist.   Eyes:      Conjunctiva/sclera: Conjunctivae normal.      Pupils: Pupils are equal, round, and reactive to light. Neck:      Trachea: No tracheal deviation. Cardiovascular:      Rate and Rhythm: Normal rate and regular rhythm. Heart sounds: Normal heart sounds. Pulmonary:      Effort: Pulmonary effort is normal. No respiratory distress. Breath sounds: Normal breath sounds. No wheezing or rales. Chest:      Chest wall: No tenderness. Abdominal:      General: Bowel sounds are normal. There is no distension. Palpations: Abdomen is soft. There is no mass. Tenderness: There is no abdominal tenderness. Hernia: No hernia is present. Musculoskeletal:         General: Normal range of motion. Cervical back: Normal range of motion and neck supple. Skin:     General: Skin is warm and dry. Capillary Refill: Capillary refill takes less than 2 seconds. Findings: No rash. Neurological:      Mental Status: She is alert and oriented to person, place, and time. Psychiatric:         Behavior: Behavior normal.     /80   Pulse 55   Temp 97.1 °F (36.2 °C)   Wt 115 lb (52.2 kg)   SpO2 99%   BMI 19.14 kg/m²    BP Readings from Last 3 Encounters:   12/06/22 112/80   09/13/22 106/80   06/14/22 116/80      Wt Readings from Last 3 Encounters:   12/06/22 115 lb (52.2 kg)   09/13/22 113 lb (51.3 kg)   06/14/22 111 lb (50.3 kg)       ASSESSMENT & PLAN:    1. Migraine without aura and without status migrainosus, not intractable  - Stable, continue current regimen  - rizatriptan (MAXALT-MLT) 5 MG disintegrating tablet; Take 1 tablet by mouth once as needed for Migraine May repeat in 2 hours if needed  Dispense: 10 tablet; Refill: 2    2. Multiple food allergies  - Stable, continue current regimen     3. Eosinophilic esophagitis  - Stable, continue current regimen     4.  Vitamin D deficiency  - Stable, continue vitamin D 3 supplementation    5. Adult situational stress disorder  - Stable, continue current regimen   - citalopram (CELEXA) 10 MG tablet; Take 1 tablet by mouth daily  Dispense: 120 tablet; Refill: 0  - Reviewed stress management techniques    6. REGGIE (generalized anxiety disorder)  - Sable, continue current regimen   - citalopram (CELEXA) 10 MG tablet; Take 1 tablet by mouth daily  Dispense: 120 tablet; Refill: 0    Continue current treatment plan. Current Outpatient Medications   Medication Sig Dispense Refill    citalopram (CELEXA) 10 MG tablet Take 1 tablet by mouth daily 120 tablet 0    rizatriptan (MAXALT-MLT) 5 MG disintegrating tablet Take 1 tablet by mouth once as needed for Migraine May repeat in 2 hours if needed 10 tablet 2    EPINEPHrine (EPIPEN 2-AVTAR) 0.3 MG/0.3ML SOAJ injection Inject 0.3 mLs into the muscle once for 1 dose Use as directed for allergic reaction 0.3 mL 0    EPINEPHrine (EPIPEN) 0.3 MG/0.3ML SOAJ injection Inject 0.3 mg into the muscle as needed Use as directed for allergic reaction      WYMZYA FE 0.4-35 MG-MCG CHEW TAKE 1 TABLET BY MOUTH EVERY DAY 28 tablet 11    Multiple Vitamins-Minerals (MULTIVITAMIN ADULT PO) Take by mouth       No current facility-administered medications for this visit. Return in about 4 months (around 4/6/2023), or if symptoms worsen or fail to improve, for migraine, food allergies, EE, stress, REGGIE. Royce Mcburney received counseling on the following healthy behaviors: nutrition, exercise, and medication adherence    Discussed use, benefit, and side effects of prescribed medications. Barriers to medication compliance addressed. All patient questions answered. Pt voiced understanding. Call office if experience side effects from medications. Please note that some or all of this record was generated using voice recognition software.  If there are any questions about the content of this document, please contact the author as some errors in transcription may have occurred.

## 2023-01-07 NOTE — PROGRESS NOTES
.  Initial ED CM Assessment/Plan of Care Note     Baseline Assessment  67 year old admitted 1/6/2023 as Inpatient with a diagnosis of PE;left.   Prior to admission patient was living with Other facility residents and residing in a Long term care facility.   Patient does not  have a Power of  for Healthcare. Agent is self.  Patient’s Primary Care Provider is Laci Yadav MD.     Chart Review  Prior ED visits/admissions: No  30-Day Readmission: No  Patients risk of Admission or ED Visit: 98%      Agency/Support  Patient dependent with ADLs.   If Dependent with ADLs, who assists?: facility staff    Prior to Admission Status  Patient resides at [facility] custodially. Patient uses [employer] for DME. Patient has dialysis M/W/F; last on 1/6/22 (Friday) Patient to return to [facility] on discharge.  Agency/Support Services  Type of Services Prior to Hospitalization: None  Agency Service Comments             [facility] [phone] [facility]    dialysis inhouse M/W/F           Support Systems: Family members  Home Devices/Equipment: Mobility assist device  Mobility Assist Devices: Wheelchair  Sensory Support Devices:   Current Status  Physical Therapy: Needs to be ordered.  Occupational Therapy: Needs to be ordered.  Nutrition/SLP - Recommendations:    Insurance  Primary: MEDICARE  Secondary: [place] MEDICAID    Barriers to Discharge  Identified Barriers to Discharge/Transition Planning:medical clearance  Progress Note    Patient presented to the ED for chest pain and SOB. Patient has medical hx of HTN, anemia, ESRD dialyzes Mon., Wed., Fri. , DM, chronic respiratory failure, GERD, dysphagia, hypothyroidism, HLD.    Plan  SW/CM - Recommendations for Discharge: Return to [facility].        Refer to SW/CM Flowsheet for Goals and objective data.      SUBJECTIVE:    Patient ID:  Ghulam Singer is a 23 y.o. female      Patient is here for a medication check food allergies, eosinophilic esophagitis, vitamin D disfluency, adult situational stress, generalized anxiety disorder. States she is doing well current regimen. She is concerned about migraine headaches. States headache in that were somewhat managed with Tylenol and Ibuprofen. She is requesting trial of  and has no further concerns. Denies thoughts of self harm, suicidal or homicidal ideations. Reviewed stress management techniques. She has recently moved to the area from [facility]. She was attending the [address] for physicians assistant. 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 Migraine   This is a chronic problem. The current episode started more than 1 year ago. The problem has been gradually worsening. The pain is located in the Right unilateral, frontal and retro-orbital region. The pain does not radiate. The pain quality is similar to prior headaches. The quality of the pain is described as throbbing. Pain scale: 5-8. Associated symptoms include nausea and photophobia. Pertinent negatives include no abdominal pain, blurred vision, coughing, fever, sinus pressure, visual change or vomiting. The symptoms are aggravated by menstrual cycle and hunger (hydration). She has tried acetaminophen, NSAIDs and Excedrin for the symptoms. Her past medical history is significant for migraine headaches and migraines in the family. There is no history of cluster headaches, hypertension or recent head traumas.      Current Outpatient Medications on File Prior to Visit   Medication Sig Dispense Refill    EPINEPHrine (EPIPEN) 0.3 MG/0.3ML SOAJ injection Inject 0.3 mg into the muscle as needed Use as directed for allergic reaction      WYMZYA FE 0.4-35 MG-MCG CHEW TAKE 1 TABLET BY MOUTH EVERY DAY 28 tablet 11    Multiple Vitamins-Minerals (MULTIVITAMIN ADULT PO) Take by mouth       No current facility-administered medications on file prior to visit. Past Medical History:   Diagnosis Date    Pneumonia 1999    history of as a child, hospitalized    Scoliosis     history of      Past Surgical History:   Procedure Laterality Date    UPPER GASTROINTESTINAL ENDOSCOPY N/A 6/3/2021    EGD BIOPSY performed by Kat Catalan DO at [place] [id number]      approx. 2014     Family History   Problem Relation Age of Onset    Hypertension Maternal Grandmother     Transient ischemic attack  Maternal Grandmother     Hypertension Maternal Grandfather     High Cholesterol Maternal Grandfather     COPD Maternal Grandfather      Social History     Socioeconomic History    Marital status: Single     Spouse name: Not on file    Number of children: Not on file    Years of education: Not on file    Highest education level: Not on file   Occupational History    Not on file   Tobacco Use    Smoking status: Never    Smokeless tobacco: Never   Vaping Use    Vaping Use: Never used   Substance and Sexual Activity    Alcohol use: Yes    Drug use: No    Sexual activity: Not on file   Other Topics Concern    Not on file   Social History Narrative    Not on file     Social Determinants of Health     Financial Resource Strain: Not on file   Food Insecurity: Not on file   Transportation Needs: Not on file   Physical Activity: Not on file   Stress: Not on file   Social Connections: Not on file   Intimate Partner Violence: Not on file   Housing Stability: Not on file       Review of Systems   Constitutional:  Negative for chills and fever. HENT:  Negative for sinus pressure. Eyes:  Positive for photophobia. Negative for blurred vision and visual disturbance. Respiratory:  Negative for cough, chest tightness, shortness of breath and wheezing. Cardiovascular:  Negative for chest pain, palpitations and leg swelling. Gastrointestinal:  Positive for nausea.  Negative for abdominal pain, constipation, diarrhea and vomiting. Musculoskeletal:  Negative for arthralgias and myalgias. Skin:  Negative for rash. Neurological:  Negative for headaches. OBJECTIVE:    Physical Exam  Vitals and nursing note reviewed. Constitutional:       General: She is not in acute distress. Appearance: She is well-developed. She is not ill-appearing. HENT:      Head: Normocephalic and atraumatic. Right Ear: External ear normal.      Left Ear: External ear normal.      Nose: Nose normal.      Mouth/Throat:      Mouth: Mucous membranes are moist.   Eyes:      Conjunctiva/sclera: Conjunctivae normal.      Pupils: Pupils are equal, round, and reactive to light. Neck:      Trachea: No tracheal deviation. Cardiovascular:      Rate and Rhythm: Normal rate and regular rhythm. Heart sounds: Normal heart sounds. Pulmonary:      Effort: Pulmonary effort is normal. No respiratory distress. Breath sounds: Normal breath sounds. No wheezing or rales. Chest:      Chest wall: No tenderness. Abdominal:      General: Bowel sounds are normal. There is no distension. Palpations: Abdomen is soft. There is no mass. Tenderness: There is no abdominal tenderness. Hernia: No hernia is present. Musculoskeletal:         General: Normal range of motion. Cervical back: Normal range of motion and neck supple. Skin:     General: Skin is warm and dry. Capillary Refill: Capillary refill takes less than 2 seconds. Findings: No rash. Neurological:      Mental Status: She is alert and oriented to person, place, and time.    Psychiatric:         Behavior: Behavior normal.     /80   Pulse 56   Temp 97.2 °F (36.2 °C)   Wt 113 lb (51.3 kg)   LMP 08/14/2022   SpO2 100%   BMI 18.80 kg/m²    BP Readings from Last 3 Encounters:   09/13/22 106/80   06/14/22 116/80   03/14/22 112/68      Wt Readings from Last 3 Encounters:   09/13/22 113 lb (51.3 kg)   06/14/22 111 lb (50.3 kg)   03/14/22 112 lb (50.8 kg)       ASSESSMENT & PLAN:    1. Migraine without aura and without status migrainosus, not intractable  - Discussed migraine headaches  - Trial of rizatriptan 5 mg by mouth once as needed, may repeat in 2 hours if symptoms persist  - rizatriptan (MAXALT-MLT) 5 MG disintegrating tablet; Take 1 tablet by mouth once as needed for Migraine May repeat in 2 hours if needed  Dispense: 10 tablet; Refill: 2  - Encouraged to keep a headache journal including timing, associated symptoms, treatments tried and response to treatment    2. Multiple food allergies  - Stable, continue current regimen   - EPINEPHrine (EPIPEN 2-AVTAR) 0.3 MG/0.3ML SOAJ injection; Inject 0.3 mLs into the muscle once for 1 dose Use as directed for allergic reaction  Dispense: 0.3 mL; Refill: 0    3. Eosinophilic esophagitis  - Stable, continue current regimen  - EPINEPHrine (EPIPEN 2-AVTAR) 0.3 MG/0.3ML SOAJ injection; Inject 0.3 mLs into the muscle once for 1 dose Use as directed for allergic reaction  Dispense: 0.3 mL; Refill: 0    4. Vitamin D deficiency  - Stable, continue vitamin D3 supplementation    5. Adult situational stress disorder  - Stable, continue current regimen  - citalopram (CELEXA) 10 MG tablet; Take 1 tablet by mouth daily  Dispense: 90 tablet; Refill: 0  - Reviewed stress management techniques    6. REGGIE (generalized anxiety disorder)  - Stable, continue current regimen  - citalopram (CELEXA) 10 MG tablet; Take 1 tablet by mouth daily  Dispense: 90 tablet; Refill: 0    Continue current treatment plan.     Current Outpatient Medications   Medication Sig Dispense Refill    citalopram (CELEXA) 10 MG tablet Take 1 tablet by mouth daily 90 tablet 0    EPINEPHrine (EPIPEN 2-AVTAR) 0.3 MG/0.3ML SOAJ injection Inject 0.3 mLs into the muscle once for 1 dose Use as directed for allergic reaction 0.3 mL 0    rizatriptan (MAXALT-MLT) 5 MG disintegrating tablet Take 1 tablet by mouth once as needed for Migraine May repeat in 2 hours if needed 10 tablet 2    EPINEPHrine (EPIPEN) 0.3 MG/0.3ML SOAJ injection Inject 0.3 mg into the muscle as needed Use as directed for allergic reaction      WYMZYA FE 0.4-35 MG-MCG CHEW TAKE 1 TABLET BY MOUTH EVERY DAY 28 tablet 11    Multiple Vitamins-Minerals (MULTIVITAMIN ADULT PO) Take by mouth       No current facility-administered medications for this visit. Return in about 3 months (around 12/13/2022), or if symptoms worsen or fail to improve, for migraines, EE, vit D, stress, REGGIE. Demetri Singletary received counseling on the following healthy behaviors: nutrition, exercise, and medication adherence    Patient given educational materials on migraine headaches    I have instructed Demetri Singletary to complete a self tracking handout on headache journal and instructed them to bring it with them to her next appointment. Discussed use, benefit, and side effects of prescribed medications. Barriers to medication compliance addressed. All patient questions answered. Pt voiced understanding. Call office if experience side effects from medications. Please note that some or all of this record was generated using voice recognition software. If there are any questions about the content of this document, please contact the author as some errors in transcription may have occurred.

## 2023-03-10 DIAGNOSIS — F41.1 GAD (GENERALIZED ANXIETY DISORDER): ICD-10-CM

## 2023-03-10 DIAGNOSIS — F43.20 ADULT SITUATIONAL STRESS DISORDER: ICD-10-CM

## 2023-03-10 RX ORDER — CITALOPRAM 10 MG/1
TABLET ORAL
Qty: 90 TABLET | OUTPATIENT
Start: 2023-03-10

## 2023-03-20 DIAGNOSIS — F41.1 GAD (GENERALIZED ANXIETY DISORDER): ICD-10-CM

## 2023-03-20 DIAGNOSIS — F43.20 ADULT SITUATIONAL STRESS DISORDER: ICD-10-CM

## 2023-03-20 RX ORDER — CITALOPRAM 10 MG/1
TABLET ORAL
Qty: 90 TABLET | OUTPATIENT
Start: 2023-03-20

## 2023-04-03 SDOH — ECONOMIC STABILITY: FOOD INSECURITY: WITHIN THE PAST 12 MONTHS, YOU WORRIED THAT YOUR FOOD WOULD RUN OUT BEFORE YOU GOT MONEY TO BUY MORE.: NEVER TRUE

## 2023-04-03 SDOH — ECONOMIC STABILITY: INCOME INSECURITY: HOW HARD IS IT FOR YOU TO PAY FOR THE VERY BASICS LIKE FOOD, HOUSING, MEDICAL CARE, AND HEATING?: NOT HARD AT ALL

## 2023-04-03 SDOH — ECONOMIC STABILITY: HOUSING INSECURITY
IN THE LAST 12 MONTHS, WAS THERE A TIME WHEN YOU DID NOT HAVE A STEADY PLACE TO SLEEP OR SLEPT IN A SHELTER (INCLUDING NOW)?: NO

## 2023-04-03 SDOH — ECONOMIC STABILITY: FOOD INSECURITY: WITHIN THE PAST 12 MONTHS, THE FOOD YOU BOUGHT JUST DIDN'T LAST AND YOU DIDN'T HAVE MONEY TO GET MORE.: NEVER TRUE

## 2023-04-04 ENCOUNTER — OFFICE VISIT (OUTPATIENT)
Dept: FAMILY MEDICINE CLINIC | Age: 28
End: 2023-04-04

## 2023-04-04 VITALS
TEMPERATURE: 97.5 F | DIASTOLIC BLOOD PRESSURE: 78 MMHG | WEIGHT: 114 LBS | BODY MASS INDEX: 18.99 KG/M2 | SYSTOLIC BLOOD PRESSURE: 114 MMHG | OXYGEN SATURATION: 98 % | HEIGHT: 65 IN | HEART RATE: 58 BPM

## 2023-04-04 DIAGNOSIS — E55.9 VITAMIN D DEFICIENCY: ICD-10-CM

## 2023-04-04 DIAGNOSIS — F41.1 GAD (GENERALIZED ANXIETY DISORDER): ICD-10-CM

## 2023-04-04 DIAGNOSIS — Z91.018 MULTIPLE FOOD ALLERGIES: ICD-10-CM

## 2023-04-04 DIAGNOSIS — F43.20 ADULT SITUATIONAL STRESS DISORDER: ICD-10-CM

## 2023-04-04 DIAGNOSIS — G43.009 MIGRAINE WITHOUT AURA AND WITHOUT STATUS MIGRAINOSUS, NOT INTRACTABLE: Primary | ICD-10-CM

## 2023-04-04 PROCEDURE — 99213 OFFICE O/P EST LOW 20 MIN: CPT | Performed by: CLINICAL NURSE SPECIALIST

## 2023-04-04 RX ORDER — CITALOPRAM 10 MG/1
10 TABLET ORAL DAILY
Qty: 120 TABLET | Refills: 0 | Status: SHIPPED | OUTPATIENT
Start: 2023-04-04

## 2023-04-04 ASSESSMENT — ENCOUNTER SYMPTOMS
CHEST TIGHTNESS: 0
SHORTNESS OF BREATH: 0
NAUSEA: 0
VOMITING: 0
WHEEZING: 0
DIARRHEA: 0
CONSTIPATION: 0
COUGH: 0
ABDOMINAL PAIN: 0

## 2023-04-04 ASSESSMENT — PATIENT HEALTH QUESTIONNAIRE - PHQ9
SUM OF ALL RESPONSES TO PHQ QUESTIONS 1-9: 0
SUM OF ALL RESPONSES TO PHQ9 QUESTIONS 1 & 2: 0
SUM OF ALL RESPONSES TO PHQ QUESTIONS 1-9: 0
2. FEELING DOWN, DEPRESSED OR HOPELESS: 0
SUM OF ALL RESPONSES TO PHQ QUESTIONS 1-9: 0
1. LITTLE INTEREST OR PLEASURE IN DOING THINGS: 0
SUM OF ALL RESPONSES TO PHQ QUESTIONS 1-9: 0

## 2023-04-04 NOTE — PROGRESS NOTES
SUBJECTIVE:    Patient ID:  Cassius Zamudio is a 33 y.o. female      Patient is here for a medication check food allergies, eosinophilic esophagitis, vitamin D disfluency, adult situational stress, generalized anxiety disorder. States she is doing well current regimen. She is concerned about migraine headaches. States headache in that were somewhat managed with Tylenol and Ibuprofen. Headaches have improved in duration, frequency, intensity and pattern remains unchanged with no new neurological symptoms. She has not needed to use her abortive medication (rizatriptan). Situational stress and anxiety are well managed with citalopram.  Denies thoughts of self harm, suicidal or homicidal ideations. Reviewed stress management techniques. 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 Current Outpatient Medications on File Prior to Visit   Medication Sig Dispense Refill    rizatriptan (MAXALT-MLT) 5 MG disintegrating tablet Take 1 tablet by mouth once as needed for Migraine May repeat in 2 hours if needed 10 tablet 2    EPINEPHrine (EPIPEN 2-AVTAR) 0.3 MG/0.3ML SOAJ injection Inject 0.3 mLs into the muscle once for 1 dose Use as directed for allergic reaction 0.3 mL 0    WYMZYA FE 0.4-35 MG-MCG CHEW TAKE 1 TABLET BY MOUTH EVERY DAY 28 tablet 11    Multiple Vitamins-Minerals (MULTIVITAMIN ADULT PO) Take by mouth       No current facility-administered medications on file prior to visit. Past Medical History:   Diagnosis Date    Pneumonia 1999    history of as a child, hospitalized    Scoliosis     history of      Past Surgical History:   Procedure Laterality Date    UPPER GASTROINTESTINAL ENDOSCOPY N/A 6/3/2021    EGD BIOPSY performed by Liana Black DO at [place] [id number]      approx.  2014     Family History   Problem Relation Age of Onset    Hypertension Maternal Grandmother     Transient ischemic attack  Maternal Grandmother

## 2023-07-11 DIAGNOSIS — F41.1 GAD (GENERALIZED ANXIETY DISORDER): ICD-10-CM

## 2023-07-11 DIAGNOSIS — F43.20 ADULT SITUATIONAL STRESS DISORDER: ICD-10-CM

## 2023-07-11 RX ORDER — CITALOPRAM HYDROBROMIDE 10 MG/1
TABLET ORAL
Qty: 90 TABLET | OUTPATIENT
Start: 2023-07-11

## 2024-05-29 ENCOUNTER — APPOINTMENT (OUTPATIENT)
Dept: URBAN - METROPOLITAN AREA CLINIC 205 | Age: 29
Setting detail: DERMATOLOGY
End: 2024-05-29

## 2024-05-29 DIAGNOSIS — D18.0 HEMANGIOMA: ICD-10-CM

## 2024-05-29 DIAGNOSIS — D22 MELANOCYTIC NEVI: ICD-10-CM

## 2024-05-29 DIAGNOSIS — L82.1 OTHER SEBORRHEIC KERATOSIS: ICD-10-CM

## 2024-05-29 DIAGNOSIS — Z71.89 OTHER SPECIFIED COUNSELING: ICD-10-CM

## 2024-05-29 PROBLEM — D22.5 MELANOCYTIC NEVI OF TRUNK: Status: ACTIVE | Noted: 2024-05-29

## 2024-05-29 PROBLEM — D18.01 HEMANGIOMA OF SKIN AND SUBCUTANEOUS TISSUE: Status: ACTIVE | Noted: 2024-05-29

## 2024-05-29 PROCEDURE — OTHER COUNSELING: OTHER

## 2024-05-29 PROCEDURE — OTHER SUNSCREEN RECOMMENDATIONS: OTHER

## 2024-05-29 PROCEDURE — 99203 OFFICE O/P NEW LOW 30 MIN: CPT

## 2024-05-29 ASSESSMENT — LOCATION DETAILED DESCRIPTION DERM: LOCATION DETAILED: INFERIOR THORACIC SPINE

## 2024-05-29 ASSESSMENT — LOCATION SIMPLE DESCRIPTION DERM: LOCATION SIMPLE: UPPER BACK

## 2024-05-29 ASSESSMENT — LOCATION ZONE DERM: LOCATION ZONE: TRUNK

## 2025-05-28 ENCOUNTER — APPOINTMENT (OUTPATIENT)
Dept: URBAN - METROPOLITAN AREA CLINIC 205 | Age: 30
Setting detail: DERMATOLOGY
End: 2025-05-28

## 2025-05-28 DIAGNOSIS — Z71.89 OTHER SPECIFIED COUNSELING: ICD-10-CM

## 2025-05-28 DIAGNOSIS — D18.0 HEMANGIOMA: ICD-10-CM

## 2025-05-28 DIAGNOSIS — D22 MELANOCYTIC NEVI: ICD-10-CM

## 2025-05-28 DIAGNOSIS — L82.1 OTHER SEBORRHEIC KERATOSIS: ICD-10-CM

## 2025-05-28 PROBLEM — D22.5 MELANOCYTIC NEVI OF TRUNK: Status: ACTIVE | Noted: 2025-05-28

## 2025-05-28 PROBLEM — D18.01 HEMANGIOMA OF SKIN AND SUBCUTANEOUS TISSUE: Status: ACTIVE | Noted: 2025-05-28

## 2025-05-28 PROCEDURE — OTHER SUNSCREEN RECOMMENDATIONS: OTHER

## 2025-05-28 PROCEDURE — OTHER COUNSELING: OTHER

## 2025-05-28 PROCEDURE — 99213 OFFICE O/P EST LOW 20 MIN: CPT

## 2025-05-28 ASSESSMENT — LOCATION SIMPLE DESCRIPTION DERM: LOCATION SIMPLE: UPPER BACK

## 2025-05-28 ASSESSMENT — LOCATION DETAILED DESCRIPTION DERM: LOCATION DETAILED: INFERIOR THORACIC SPINE

## 2025-05-28 ASSESSMENT — LOCATION ZONE DERM: LOCATION ZONE: TRUNK

## (undated) DEVICE — LINE SAMP O2 6.5FT W/FEMALE CONN F/ADULT CAPNOLINE PLUS

## (undated) DEVICE — 1200CC GUARDIAN II: Brand: GUARDIAN

## (undated) DEVICE — BITE BLOCK W/VELCRO STRAP

## (undated) DEVICE — MERCY DEFIANCE ENDO KIT: Brand: MEDLINE INDUSTRIES, INC.

## (undated) DEVICE — FORCEPS BX L240CM JAW DIA2.4MM ORNG L CAP W/ NDL DISP RAD

## (undated) DEVICE — CONNECTOR TBNG AUX H2O JET DISP FOR OLY 160/180 SER